# Patient Record
Sex: MALE | Race: WHITE | NOT HISPANIC OR LATINO | Employment: FULL TIME | URBAN - METROPOLITAN AREA
[De-identification: names, ages, dates, MRNs, and addresses within clinical notes are randomized per-mention and may not be internally consistent; named-entity substitution may affect disease eponyms.]

---

## 2017-07-25 RX ORDER — RAMIPRIL 5 MG/1
CAPSULE ORAL
Qty: 90 CAPSULE | Refills: 2 | Status: SHIPPED | OUTPATIENT
Start: 2017-07-25 | End: 2018-04-19 | Stop reason: SDUPTHER

## 2017-07-25 RX ORDER — PRAVASTATIN SODIUM 40 MG
TABLET ORAL
Qty: 90 TABLET | Refills: 2 | Status: SHIPPED | OUTPATIENT
Start: 2017-07-25 | End: 2018-04-19 | Stop reason: SDUPTHER

## 2017-07-26 ENCOUNTER — TELEPHONE (OUTPATIENT)
Dept: ONCOLOGY | Facility: CLINIC | Age: 47
End: 2017-07-26

## 2017-07-26 NOTE — TELEPHONE ENCOUNTER
----- Message from Og Bryant MD sent at 7/26/2017  4:05 PM EDT -----  Regarding: follow up  I saw him a year ago and suggested annual follow up, which should be around now.  Did he get a letter to schedule f/u with me?  RICK

## 2017-08-02 ENCOUNTER — LAB (OUTPATIENT)
Dept: OTHER | Facility: HOSPITAL | Age: 47
End: 2017-08-02

## 2017-08-02 DIAGNOSIS — R79.89 ELEVATED FERRITIN: ICD-10-CM

## 2017-08-02 LAB
ALBUMIN SERPL-MCNC: 4.7 G/DL (ref 3.5–5.2)
ALBUMIN/GLOB SERPL: 1.8 G/DL
ALP SERPL-CCNC: 55 U/L (ref 39–117)
ALT SERPL W P-5'-P-CCNC: 34 U/L (ref 1–41)
ANION GAP SERPL CALCULATED.3IONS-SCNC: 11.9 MMOL/L
AST SERPL-CCNC: 24 U/L (ref 1–40)
BASOPHILS # BLD AUTO: 0.04 10*3/MM3 (ref 0–0.2)
BASOPHILS NFR BLD AUTO: 0.8 % (ref 0–1.5)
BILIRUB SERPL-MCNC: 0.3 MG/DL (ref 0.1–1.2)
BUN BLD-MCNC: 15 MG/DL (ref 6–20)
BUN/CREAT SERPL: 14.4 (ref 7–25)
CALCIUM SPEC-SCNC: 9.5 MG/DL (ref 8.6–10.5)
CHLORIDE SERPL-SCNC: 104 MMOL/L (ref 98–107)
CO2 SERPL-SCNC: 28.1 MMOL/L (ref 22–29)
CREAT BLD-MCNC: 1.04 MG/DL (ref 0.76–1.27)
DEPRECATED RDW RBC AUTO: 41.1 FL (ref 37–54)
EOSINOPHIL # BLD AUTO: 0.15 10*3/MM3 (ref 0–0.7)
EOSINOPHIL NFR BLD AUTO: 3 % (ref 0.3–6.2)
ERYTHROCYTE [DISTWIDTH] IN BLOOD BY AUTOMATED COUNT: 12.8 % (ref 11.5–14.5)
FERRITIN SERPL-MCNC: 575.6 NG/ML (ref 30–400)
GFR SERPL CREATININE-BSD FRML MDRD: 77 ML/MIN/1.73
GLOBULIN UR ELPH-MCNC: 2.6 GM/DL
GLUCOSE BLD-MCNC: 102 MG/DL (ref 65–99)
HCT VFR BLD AUTO: 41.6 % (ref 40.4–52.2)
HGB BLD-MCNC: 14.2 G/DL (ref 13.7–17.6)
HGB RETIC QN: 33.9 PG (ref 32.7–38.6)
IMM GRANULOCYTES # BLD: 0.01 10*3/MM3 (ref 0–0.03)
IMM GRANULOCYTES NFR BLD: 0.2 % (ref 0–0.5)
IMM RETICS NFR: 9.9 % (ref 0.7–13.7)
IRON 24H UR-MRATE: 86 MCG/DL (ref 59–158)
IRON SATN MFR SERPL: 27 % (ref 20–50)
LYMPHOCYTES # BLD AUTO: 1.99 10*3/MM3 (ref 0.9–4.8)
LYMPHOCYTES NFR BLD AUTO: 40.1 % (ref 19.6–45.3)
MCH RBC QN AUTO: 29.8 PG (ref 27–32.7)
MCHC RBC AUTO-ENTMCNC: 34.1 G/DL (ref 32.6–36.4)
MCV RBC AUTO: 87.2 FL (ref 79.8–96.2)
MONOCYTES # BLD AUTO: 0.42 10*3/MM3 (ref 0.2–1.2)
MONOCYTES NFR BLD AUTO: 8.5 % (ref 5–12)
NEUTROPHILS # BLD AUTO: 2.35 10*3/MM3 (ref 1.9–8.1)
NEUTROPHILS NFR BLD AUTO: 47.4 % (ref 42.7–76)
NRBC BLD MANUAL-RTO: 0 /100 WBC (ref 0–0)
PLATELET # BLD AUTO: 256 10*3/MM3 (ref 140–500)
PMV BLD AUTO: 11 FL (ref 6–12)
POTASSIUM BLD-SCNC: 4.4 MMOL/L (ref 3.5–5.2)
PROT SERPL-MCNC: 7.3 G/DL (ref 6–8.5)
RBC # BLD AUTO: 4.77 10*6/MM3 (ref 4.6–6)
RETICS/RBC NFR AUTO: 1.56 % (ref 0.5–1.5)
SODIUM BLD-SCNC: 144 MMOL/L (ref 136–145)
TIBC SERPL-MCNC: 323 MCG/DL (ref 298–536)
TRANSFERRIN SERPL-MCNC: 217 MG/DL (ref 200–360)
WBC NRBC COR # BLD: 4.96 10*3/MM3 (ref 4.5–10.7)

## 2017-08-02 PROCEDURE — 85046 RETICYTE/HGB CONCENTRATE: CPT | Performed by: INTERNAL MEDICINE

## 2017-08-02 PROCEDURE — 80053 COMPREHEN METABOLIC PANEL: CPT | Performed by: INTERNAL MEDICINE

## 2017-08-02 PROCEDURE — 82728 ASSAY OF FERRITIN: CPT | Performed by: INTERNAL MEDICINE

## 2017-08-02 PROCEDURE — 85025 COMPLETE CBC W/AUTO DIFF WBC: CPT | Performed by: INTERNAL MEDICINE

## 2017-08-02 PROCEDURE — 83540 ASSAY OF IRON: CPT | Performed by: INTERNAL MEDICINE

## 2017-08-02 PROCEDURE — 84466 ASSAY OF TRANSFERRIN: CPT | Performed by: INTERNAL MEDICINE

## 2017-08-02 PROCEDURE — 36415 COLL VENOUS BLD VENIPUNCTURE: CPT

## 2017-08-09 ENCOUNTER — APPOINTMENT (OUTPATIENT)
Dept: OTHER | Facility: HOSPITAL | Age: 47
End: 2017-08-09

## 2017-08-09 ENCOUNTER — OFFICE VISIT (OUTPATIENT)
Dept: ONCOLOGY | Facility: CLINIC | Age: 47
End: 2017-08-09

## 2017-08-09 VITALS
WEIGHT: 204 LBS | TEMPERATURE: 98.8 F | RESPIRATION RATE: 16 BRPM | HEART RATE: 70 BPM | HEIGHT: 71 IN | DIASTOLIC BLOOD PRESSURE: 77 MMHG | OXYGEN SATURATION: 100 % | BODY MASS INDEX: 28.56 KG/M2 | SYSTOLIC BLOOD PRESSURE: 122 MMHG

## 2017-08-09 DIAGNOSIS — R79.89 ELEVATED FERRITIN: Primary | ICD-10-CM

## 2017-08-09 PROCEDURE — 99213 OFFICE O/P EST LOW 20 MIN: CPT | Performed by: INTERNAL MEDICINE

## 2017-08-09 PROCEDURE — G0463 HOSPITAL OUTPT CLINIC VISIT: HCPCS | Performed by: INTERNAL MEDICINE

## 2017-08-09 NOTE — PROGRESS NOTES
"[facility] GROUP OUTPATIENT FOLLOW UP CLINIC VISIT    REASON FOR FOLLOW-UP:    1. Elevated ferritin    HISTORY OF PRESENT ILLNESS:  Alon Rivera is a 46 y.o. male who returns today for follow up of the above issue.  He continues to feel well and has no complaints today.      PAST MEDICAL, SURGICAL, FAMILY, AND SOCIAL HISTORIES were reviewed with the patient and in the electronic medical record, and were updated if indicated.    ALLERGIES:  No Known Allergies    MEDICATIONS:  The medication list has been reviewed with the patient by the medical assistant, and the list has been updated in the electronic medical record, which I reviewed.  Medication dosages and frequencies were confirmed to be accurate.    REVIEW OF SYSTEMS:  PAIN:  See Vital Signs below.  GENERAL:  No fevers, chills, night sweats, or unintended weight loss.  SKIN:  No rashes or non-healing lesions.  HEME/LYMPH:  No abnormal bleeding.  No palpable lymphadenopathy.  EYES:  No vision changes or diplopia.  ENT:  No sore throat or difficulty swallowing.  RESPIRATORY:  No cough, shortness of breath, hemoptysis, or wheezing.  CARDIOVASCULAR:  No chest pain, palpitations, orthopnea, or dyspnea on exertion.  GASTROINTESTINAL:  No abdominal pain, nausea, vomiting, constipation, diarrhea, melena, or hematochezia.  GENITOURINARY:  No dysuria or hematuria.  MUSCULOSKELETAL:  No joint pain, swelling, or erythema.  NEUROLOGIC:  No dizziness, loss of consciousness, or seizures.  PSYCHIATRIC:  No depression, anxiety, or mood changes.    Vitals:    08/09/17 1144   BP: 122/77   Pulse: 70   Resp: 16   Temp: 98.8 °F (37.1 °C)   TempSrc: Oral   SpO2: 100%   Weight: 204 lb (92.5 kg)   Height: 71\" (180.3 cm)   PainSc: 0-No pain       PHYSICAL EXAMINATION:  GENERAL:  Well-developed well-nourished male; awake, alert and oriented, in no acute distress.  SKIN:  Warm and dry, without rashes, purpura, or petechiae.  HEAD:  Normocephalic, atraumatic.  EYES:  Pupils equal, " round and reactive to light.  Extraocular movements intact.  Conjunctivae normal.  EARS:  Hearing intact.  NOSE:  Septum midline.  No excoriations or nasal discharge.  MOUTH:  No stomatitis or ulcers.  Lips are normal.  THROAT:  Oropharynx without lesions or exudates.  NECK:  Supple with good range of motion; no thyromegaly or masses; no JVD or bruits.  LYMPHATICS:  No cervical, supraclavicular, axillary, or inguinal lymphadenopathy.  CHEST:  Lungs are clear to auscultation bilaterally.  No wheezes, rales, or rhonchi.  HEART:  Regular rate; normal rhythm.  No murmurs, gallops or rubs.  ABDOMEN:  Soft, non-tender, non-distended.  Normal active bowel sounds.  No organomegaly.  EXTREMITIES:  No clubbing, cyanosis, or edema.  NEUROLOGICAL:  No focal neurologic deficits.    DIAGNOSTIC DATA:  Lab Results   Component Value Date    WBC 4.96 08/02/2017    HGB 14.2 08/02/2017    HCT 41.6 08/02/2017    MCV 87.2 08/02/2017     08/02/2017     Ferritin 575.  27% iron saturation  CMP normal      ASSESSMENT:  This is a 46 y.o. male with:  1.  Elevated ferritin: His iron studies are normal suggesting that this is not secondary to iron overload.  That being said, I do not have another good explanation.  There is no significant alcohol use.  No evidence for autoimmune disease or inflammation.  Sedimentation rate and CRP were normal.  HFE gene testing was normal.  He did have some infections recently but these have been effectively treated and he is asymptomatic.  I do not think it is secondary to medication use.  I did a literature search to see if statins could cause an elevated ferritin level and, if anything, they should cause the ferritin to decrease.     Thankfully, now a year after we last checked his ferritin is down 200 points.  I will see him back in 1 year for follow-up in this ferritin has improved at that time and we will likely release him to follow up with primary care.    I did discuss with him today whether he  wanted further CT imaging of his chest to follow-up the abnormalities from a few years ago.  He does not wish to have any further imaging.    PLAN:  1.  Return in 12 months with labs including iron studies and a ferritin level done prior to that visit.    Og Bryant MD

## 2017-09-18 RX ORDER — PANTOPRAZOLE SODIUM 40 MG/1
TABLET, DELAYED RELEASE ORAL
Qty: 90 TABLET | Refills: 2 | Status: SHIPPED | OUTPATIENT
Start: 2017-09-18 | End: 2018-06-17 | Stop reason: SDUPTHER

## 2018-04-19 RX ORDER — PRAVASTATIN SODIUM 40 MG
TABLET ORAL
Qty: 90 TABLET | Refills: 1 | Status: SHIPPED | OUTPATIENT
Start: 2018-04-19 | End: 2018-10-23 | Stop reason: SDUPTHER

## 2018-04-19 RX ORDER — RAMIPRIL 5 MG/1
CAPSULE ORAL
Qty: 90 CAPSULE | Refills: 1 | Status: SHIPPED | OUTPATIENT
Start: 2018-04-19 | End: 2018-10-23 | Stop reason: SDUPTHER

## 2018-06-18 RX ORDER — PANTOPRAZOLE SODIUM 40 MG/1
TABLET, DELAYED RELEASE ORAL
Qty: 90 TABLET | Refills: 0 | Status: SHIPPED | OUTPATIENT
Start: 2018-06-18 | End: 2018-09-14 | Stop reason: SDUPTHER

## 2018-09-14 RX ORDER — PANTOPRAZOLE SODIUM 40 MG/1
TABLET, DELAYED RELEASE ORAL
Qty: 90 TABLET | Refills: 0 | Status: SHIPPED | OUTPATIENT
Start: 2018-09-14 | End: 2018-10-23 | Stop reason: SDUPTHER

## 2018-10-17 RX ORDER — PRAVASTATIN SODIUM 40 MG
TABLET ORAL
Qty: 90 TABLET | Refills: 0 | OUTPATIENT
Start: 2018-10-17

## 2018-10-17 RX ORDER — RAMIPRIL 5 MG/1
CAPSULE ORAL
Qty: 90 CAPSULE | Refills: 0 | OUTPATIENT
Start: 2018-10-17

## 2018-10-23 ENCOUNTER — OFFICE VISIT (OUTPATIENT)
Dept: SPORTS MEDICINE | Facility: CLINIC | Age: 48
End: 2018-10-23

## 2018-10-23 VITALS
BODY MASS INDEX: 26.32 KG/M2 | OXYGEN SATURATION: 99 % | HEART RATE: 64 BPM | DIASTOLIC BLOOD PRESSURE: 72 MMHG | HEIGHT: 71 IN | SYSTOLIC BLOOD PRESSURE: 120 MMHG | WEIGHT: 188 LBS

## 2018-10-23 DIAGNOSIS — K21.9 GASTROESOPHAGEAL REFLUX DISEASE, ESOPHAGITIS PRESENCE NOT SPECIFIED: ICD-10-CM

## 2018-10-23 DIAGNOSIS — Z00.00 ANNUAL PHYSICAL EXAM: Primary | ICD-10-CM

## 2018-10-23 DIAGNOSIS — Z12.11 COLON CANCER SCREENING: ICD-10-CM

## 2018-10-23 DIAGNOSIS — E78.2 MIXED HYPERLIPIDEMIA: ICD-10-CM

## 2018-10-23 DIAGNOSIS — I10 ESSENTIAL HYPERTENSION: ICD-10-CM

## 2018-10-23 PROCEDURE — 99396 PREV VISIT EST AGE 40-64: CPT | Performed by: FAMILY MEDICINE

## 2018-10-23 RX ORDER — RAMIPRIL 5 MG/1
5 CAPSULE ORAL DAILY
Qty: 90 CAPSULE | Refills: 3 | Status: SHIPPED | OUTPATIENT
Start: 2018-10-23 | End: 2020-02-05

## 2018-10-23 RX ORDER — PANTOPRAZOLE SODIUM 40 MG/1
40 TABLET, DELAYED RELEASE ORAL DAILY
Qty: 90 TABLET | Refills: 3 | Status: SHIPPED | OUTPATIENT
Start: 2018-10-23 | End: 2019-10-29

## 2018-10-23 RX ORDER — CIPROFLOXACIN 500 MG/1
TABLET, FILM COATED ORAL
Refills: 0 | COMMUNITY
Start: 2018-10-20 | End: 2018-11-29

## 2018-10-23 RX ORDER — METRONIDAZOLE 500 MG/1
TABLET ORAL
Refills: 0 | COMMUNITY
Start: 2018-10-20 | End: 2018-11-29

## 2018-10-23 RX ORDER — PRAVASTATIN SODIUM 40 MG
40 TABLET ORAL DAILY
Qty: 90 TABLET | Refills: 3 | Status: SHIPPED | OUTPATIENT
Start: 2018-10-23 | End: 2020-02-05

## 2018-10-23 NOTE — PROGRESS NOTES
"Alon Rivera is here today for an annual physical exam.     Eating a healthy diet. Exercising routinely. Recently losing weight on purpose.     Recently diagnosed with diverticulitis in urgent care, improving with cipro and flagyl. Had been having pain during the week, worsened Saturday and couldn't wait until today's visit.     GERD - mostly stable with protonix daily. Notes he does have some reflux most mornings. Trying to change diet and exercise recently     Health Maintenance   Topic Date Due   • LIPID PANEL  08/03/2017   • INFLUENZA VACCINE  08/01/2018   • ANNUAL PHYSICAL  10/24/2019   • TDAP/TD VACCINES (2 - Td) 10/29/2023       Review of Systems   Constitutional: Negative.    Respiratory: Negative.    Gastrointestinal:        Recent diverticulitis and Reflux, as per history of present illness   Genitourinary: Negative.    Musculoskeletal: Negative.    Psychiatric/Behavioral: Negative.        /72   Pulse 64   Ht 180.3 cm (71\")   Wt 85.3 kg (188 lb)   SpO2 99%   BMI 26.22 kg/m²      Physical Exam    Vital signs reviewed.  General appearance: No acute distress  Eyes: conjunctiva clear without erythema; pupils equally round and reactive  ENT: external ears and nose normal; hearing normal, oropharynx clear  Neck: supple; no thyromegaly  CV: normal rate and rhythm; no peripheral edema  Respiratory: normal respiratory effort; lungs clear to auscultation bilaterally  MSK: normal gait and station; no focal joint deformity or swelling  Skin: no rash or wounds; normal turgor  Neuro: cranial nerves 2-12 grossly intact; normal sensation to light touch  Psych: mood and affect normal; recent and remote memory intact    Alon was seen today for annual exam.    Diagnoses and all orders for this visit:    Annual physical exam  -     CBC & Differential  -     Comprehensive Metabolic Panel  -     Lipid Panel  -     Thyroid Cascade Profile  -     Urinalysis With Culture If Indicated - Urine, Clean " Catch    Gastroesophageal reflux disease, esophagitis presence not specified  -     Ambulatory Referral to Gastroenterology    Colon cancer screening  -     Ambulatory Referral to Gastroenterology    Other orders  -     ramipril (ALTACE) 5 MG capsule; Take 1 capsule by mouth Daily.  -     pravastatin (PRAVACHOL) 40 MG tablet; Take 1 tablet by mouth Daily.  -     pantoprazole (PROTONIX) 40 MG EC tablet; Take 1 tablet by mouth Daily.        Encourage healthy diet and exercise.  Encourage patient to stay up to date on screening examinations as indicated based on age and risk factors.  Recommend follow-up with GI to consider reevaluation of his chronic, uncontrolled reflux and plan colonoscopy based on new age recommendation as well as recent diverticulitis.  In the interim, recommend he try adding Zantac or Pepcid at bedtime.  Also consider elevating the head of the bed.  Continue current management of mild hypertension and hyperlipidemia.    EMR Dragon/Transcription disclaimer:    Much of this encounter note is an electronic transcription/translation of spoken language to printed text.  The electronic translation of spoken language may permit erroneous, or at times, nonsensical words or phrases to be inadvertently transcribed.  Although I have reviewed the note for such errors some may still exist.

## 2018-10-24 PROBLEM — Z22.7 LATENT TUBERCULOSIS: Status: ACTIVE | Noted: 2018-10-24

## 2018-10-26 LAB
ALBUMIN SERPL-MCNC: 5 G/DL (ref 3.5–5.2)
ALBUMIN/GLOB SERPL: 1.8 G/DL
ALP SERPL-CCNC: 50 U/L (ref 39–117)
ALT SERPL-CCNC: 35 U/L (ref 1–41)
APPEARANCE UR: CLEAR
AST SERPL-CCNC: 30 U/L (ref 1–40)
BACTERIA #/AREA URNS HPF: NORMAL /HPF
BACTERIA UR CULT: NORMAL
BACTERIA UR CULT: NORMAL
BASOPHILS # BLD AUTO: 0.02 10*3/MM3 (ref 0–0.2)
BASOPHILS NFR BLD AUTO: 0.5 % (ref 0–1.5)
BILIRUB SERPL-MCNC: 0.3 MG/DL (ref 0.1–1.2)
BILIRUB UR QL STRIP: NEGATIVE
BUN SERPL-MCNC: 15 MG/DL (ref 6–20)
BUN/CREAT SERPL: 13.8 (ref 7–25)
CALCIUM SERPL-MCNC: 10.7 MG/DL (ref 8.6–10.5)
CHLORIDE SERPL-SCNC: 99 MMOL/L (ref 98–107)
CHOLEST SERPL-MCNC: 165 MG/DL (ref 0–200)
CO2 SERPL-SCNC: 28.8 MMOL/L (ref 22–29)
COLOR UR: YELLOW
CREAT SERPL-MCNC: 1.09 MG/DL (ref 0.76–1.27)
EOSINOPHIL # BLD AUTO: 0.17 10*3/MM3 (ref 0–0.7)
EOSINOPHIL NFR BLD AUTO: 3.9 % (ref 0.3–6.2)
EPI CELLS #/AREA URNS HPF: NORMAL /HPF
ERYTHROCYTE [DISTWIDTH] IN BLOOD BY AUTOMATED COUNT: 13.5 % (ref 11.5–14.5)
GLOBULIN SER CALC-MCNC: 2.8 GM/DL
GLUCOSE SERPL-MCNC: 88 MG/DL (ref 65–99)
GLUCOSE UR QL: NEGATIVE
HCT VFR BLD AUTO: 46.8 % (ref 40.4–52.2)
HDLC SERPL-MCNC: 47 MG/DL (ref 40–60)
HGB BLD-MCNC: 14.5 G/DL (ref 13.7–17.6)
HGB UR QL STRIP: NEGATIVE
IMM GRANULOCYTES # BLD: 0 10*3/MM3 (ref 0–0.03)
IMM GRANULOCYTES NFR BLD: 0 % (ref 0–0.5)
KETONES UR QL STRIP: NEGATIVE
LDLC SERPL CALC-MCNC: 103 MG/DL (ref 0–100)
LEUKOCYTE ESTERASE UR QL STRIP: NEGATIVE
LYMPHOCYTES # BLD AUTO: 1.79 10*3/MM3 (ref 0.9–4.8)
LYMPHOCYTES NFR BLD AUTO: 40.9 % (ref 19.6–45.3)
MCH RBC QN AUTO: 29.1 PG (ref 27–32.7)
MCHC RBC AUTO-ENTMCNC: 31 G/DL (ref 32.6–36.4)
MCV RBC AUTO: 94 FL (ref 79.8–96.2)
MICRO URNS: ABNORMAL
MONOCYTES # BLD AUTO: 0.35 10*3/MM3 (ref 0.2–1.2)
MONOCYTES NFR BLD AUTO: 8 % (ref 5–12)
MUCOUS THREADS URNS QL MICRO: PRESENT /HPF
NEUTROPHILS # BLD AUTO: 2.05 10*3/MM3 (ref 1.9–8.1)
NEUTROPHILS NFR BLD AUTO: 46.7 % (ref 42.7–76)
NITRITE UR QL STRIP: POSITIVE
PH UR STRIP: 5.5 [PH] (ref 5–7.5)
PLATELET # BLD AUTO: 292 10*3/MM3 (ref 140–500)
POTASSIUM SERPL-SCNC: 4.3 MMOL/L (ref 3.5–5.2)
PROT SERPL-MCNC: 7.8 G/DL (ref 6–8.5)
PROT UR QL STRIP: NEGATIVE
RBC # BLD AUTO: 4.98 10*6/MM3 (ref 4.6–6)
RBC #/AREA URNS HPF: NORMAL /HPF
SODIUM SERPL-SCNC: 143 MMOL/L (ref 136–145)
SP GR UR: 1.02 (ref 1–1.03)
TRIGL SERPL-MCNC: 75 MG/DL (ref 0–150)
TSH SERPL DL<=0.005 MIU/L-ACNC: 3.17 UIU/ML (ref 0.45–4.5)
URINALYSIS REFLEX: ABNORMAL
UROBILINOGEN UR STRIP-MCNC: 0.2 MG/DL (ref 0.2–1)
VLDLC SERPL CALC-MCNC: 15 MG/DL (ref 5–40)
WBC # BLD AUTO: 4.38 10*3/MM3 (ref 4.5–10.7)
WBC #/AREA URNS HPF: NORMAL /HPF

## 2018-10-29 ENCOUNTER — TELEPHONE (OUTPATIENT)
Dept: SPORTS MEDICINE | Facility: CLINIC | Age: 48
End: 2018-10-29

## 2018-11-29 ENCOUNTER — OFFICE VISIT (OUTPATIENT)
Dept: GASTROENTEROLOGY | Facility: CLINIC | Age: 48
End: 2018-11-29

## 2018-11-29 VITALS
WEIGHT: 185.8 LBS | TEMPERATURE: 98.4 F | SYSTOLIC BLOOD PRESSURE: 138 MMHG | BODY MASS INDEX: 26.01 KG/M2 | HEIGHT: 71 IN | DIASTOLIC BLOOD PRESSURE: 74 MMHG

## 2018-11-29 DIAGNOSIS — Z12.11 ENCOUNTER FOR SCREENING FOR MALIGNANT NEOPLASM OF COLON: ICD-10-CM

## 2018-11-29 DIAGNOSIS — K57.92 DIVERTICULITIS: Primary | ICD-10-CM

## 2018-11-29 PROCEDURE — 99214 OFFICE O/P EST MOD 30 MIN: CPT | Performed by: INTERNAL MEDICINE

## 2018-11-29 RX ORDER — RANITIDINE 150 MG/1
150 TABLET ORAL NIGHTLY
COMMUNITY
End: 2019-11-20 | Stop reason: SDUPTHER

## 2018-11-29 NOTE — PROGRESS NOTES
Answers for HPI/ROS submitted by the patient on 11/27/2018   Abdominal pain  Chronicity: recurrent  Onset: more than 1 month ago  Onset quality: gradual  Frequency: every several days  Episode duration: 2 hours  Progression since onset: waxing and waning  Pain location: periumbilical region  Pain - numeric: 3/10  Pain quality: dull  Radiates to: does not radiate  anorexia: No  arthralgias: No  belching: No  constipation: No  diarrhea: No  dysuria: No  fever: No  flatus: No  frequency: No  headaches: No  hematochezia: No  hematuria: No  melena: No  myalgias: No  nausea: No  weight loss: No  vomiting: No  Aggravated by: nothing  Relieved by: movement, standing  Chief Complaint   Patient presents with   • Abdominal Pain   • Heartburn       Alon Rivera is a  48 y.o. male here for a follow up visit for history of diverticulitis.    HPI     Patient 48-year-old male with history of hypertension and hyperlipidemia as well as GERD presenting after episode of lower abdominal pain diagnosed clinically as diverticulitis.  Patient reports no CAT scan was performed but patient put on antibiotics with good response.  Patient with clinical diverticulitis here for follow-up.  Patient has had intermittent episodes of reflux type symptoms with no heartburn but no dysphagia was noted.  Patient does report increased exertion and at exercise as well as monitoring his diet with 30 pound weight loss.  Patient reports this was particularly related to his dieting.  Patient denies hematemesis no melena or bright red blood per rectum.      Past Medical History:   Diagnosis Date   • Allergic rhinitis    • GERD (gastroesophageal reflux disease)    • H/O Chronic back pain    • H/O Hypogonadism in male    • H/O Pulmonary histoplasmosis     Resolved   • Hyperlipidemia    • Hypertension          Current Outpatient Medications:   •  Multiple Vitamins-Minerals (MULTIVITAMIN ADULT PO), Take 1 tablet by mouth Daily., Disp: , Rfl:   •  pantoprazole  (PROTONIX) 40 MG EC tablet, Take 1 tablet by mouth Daily., Disp: 90 tablet, Rfl: 3  •  pravastatin (PRAVACHOL) 40 MG tablet, Take 1 tablet by mouth Daily., Disp: 90 tablet, Rfl: 3  •  ramipril (ALTACE) 5 MG capsule, Take 1 capsule by mouth Daily., Disp: 90 capsule, Rfl: 3  •  raNITIdine (ZANTAC) 150 MG tablet, Take 150 mg by mouth Every Night., Disp: , Rfl:     No Known Allergies    Social History     Socioeconomic History   • Marital status:      Spouse name: Shama   • Number of children: 2   • Years of education: Not on file   • Highest education level: Not on file   Social Needs   • Financial resource strain: Not on file   • Food insecurity - worry: Not on file   • Food insecurity - inability: Not on file   • Transportation needs - medical: Not on file   • Transportation needs - non-medical: Not on file   Occupational History   • Occupation:      Employer: [id number]   Tobacco Use   • Smoking status: Never Smoker   • Smokeless tobacco: Never Used   Substance and Sexual Activity   • Alcohol use: Yes     Comment: occasional   • Drug use: No   • Sexual activity: Not on file   Other Topics Concern   • Not on file   Social History Narrative   • Not on file       Family History   Problem Relation Age of Onset   • Cancer Other        Review of Systems   Constitutional: Negative for fever.   HENT: Negative for trouble swallowing.    Respiratory: Negative.    Cardiovascular: Negative.    Gastrointestinal: Positive for abdominal pain. Negative for constipation, diarrhea, nausea and vomiting.   Genitourinary: Negative for dysuria, frequency and hematuria.   Musculoskeletal: Negative for arthralgias and myalgias.   Skin: Negative.    Neurological: Negative for headaches.   Hematological: Negative.        Vitals:    11/29/18 1041   BP: 138/74   Temp: 98.4 °F (36.9 °C)       Physical Exam   Constitutional: He is oriented to person, place, and time. He appears well-developed and well-nourished.   HENT:    Head: Normocephalic and atraumatic.   Eyes: Pupils are equal, round, and reactive to light. No scleral icterus.   Cardiovascular: Normal rate and regular rhythm. Exam reveals no gallop and no friction rub.   No murmur heard.  Pulmonary/Chest: Effort normal.   Abdominal: Soft. Bowel sounds are normal. He exhibits no distension and no mass. There is no tenderness. No hernia.   Neurological: He is alert and oriented to person, place, and time.   Skin: Skin is warm and dry. No rash noted.   Psychiatric: He has a normal mood and affect. His behavior is normal.   Vitals reviewed.      Office Visit on 10/23/2018   Component Date Value Ref Range Status   • WBC 10/23/2018 4.38* 4.50 - 10.70 10*3/mm3 Final   • RBC 10/23/2018 4.98  4.60 - 6.00 10*6/mm3 Final   • Hemoglobin 10/23/2018 14.5  13.7 - 17.6 g/dL Final   • Hematocrit 10/23/2018 46.8  40.4 - 52.2 % Final   • MCV 10/23/2018 94.0  79.8 - 96.2 fL Final   • MCH 10/23/2018 29.1  27.0 - 32.7 pg Final   • MCHC 10/23/2018 31.0* 32.6 - 36.4 g/dL Final   • RDW 10/23/2018 13.5  11.5 - 14.5 % Final   • Platelets 10/23/2018 292  140 - 500 10*3/mm3 Final   • Neutrophil Rel % 10/23/2018 46.7  42.7 - 76.0 % Final   • Lymphocyte Rel % 10/23/2018 40.9  19.6 - 45.3 % Final   • Monocyte Rel % 10/23/2018 8.0  5.0 - 12.0 % Final   • Eosinophil Rel % 10/23/2018 3.9  0.3 - 6.2 % Final   • Basophil Rel % 10/23/2018 0.5  0.0 - 1.5 % Final   • Neutrophils Absolute 10/23/2018 2.05  1.90 - 8.10 10*3/mm3 Final   • Lymphocytes Absolute 10/23/2018 1.79  0.90 - 4.80 10*3/mm3 Final   • Monocytes Absolute 10/23/2018 0.35  0.20 - 1.20 10*3/mm3 Final   • Eosinophils Absolute 10/23/2018 0.17  0.00 - 0.70 10*3/mm3 Final   • Basophils Absolute 10/23/2018 0.02  0.00 - 0.20 10*3/mm3 Final   • Immature Granulocyte Rel % 10/23/2018 0.0  0.0 - 0.5 % Final   • Immature Grans Absolute 10/23/2018 0.00  0.00 - 0.03 10*3/mm3 Final   • Glucose 10/23/2018 88  65 - 99 mg/dL Final   • BUN 10/23/2018 15  6 - 20 mg/dL  Final   • Creatinine 10/23/2018 1.09  0.76 - 1.27 mg/dL Final   • eGFR Non  Am 10/23/2018 73  >60 mL/min/1.73 Final   • eGFR African Am 10/23/2018 88  >60 mL/min/1.73 Final   • BUN/Creatinine Ratio 10/23/2018 13.8  7.0 - 25.0 Final   • Sodium 10/23/2018 143  136 - 145 mmol/L Final   • Potassium 10/23/2018 4.3  3.5 - 5.2 mmol/L Final   • Chloride 10/23/2018 99  98 - 107 mmol/L Final   • Total CO2 10/23/2018 28.8  22.0 - 29.0 mmol/L Final   • Calcium 10/23/2018 10.7* 8.6 - 10.5 mg/dL Final   • Total Protein 10/23/2018 7.8  6.0 - 8.5 g/dL Final   • Albumin 10/23/2018 5.00  3.50 - 5.20 g/dL Final   • Globulin 10/23/2018 2.8  gm/dL Final   • A/G Ratio 10/23/2018 1.8  g/dL Final   • Total Bilirubin 10/23/2018 0.3  0.1 - 1.2 mg/dL Final   • Alkaline Phosphatase 10/23/2018 50  39 - 117 U/L Final   • AST (SGOT) 10/23/2018 30  1 - 40 U/L Final   • ALT (SGPT) 10/23/2018 35  1 - 41 U/L Final   • Total Cholesterol 10/23/2018 165  0 - 200 mg/dL Final   • Triglycerides 10/23/2018 75  0 - 150 mg/dL Final   • HDL Cholesterol 10/23/2018 47  40 - 60 mg/dL Final   • VLDL Cholesterol 10/23/2018 15  5 - 40 mg/dL Final   • LDL Cholesterol  10/23/2018 103* 0 - 100 mg/dL Final   • TSH 10/23/2018 3.170  0.450 - 4.500 uIU/mL Final   • Specific Gravity, UA 10/23/2018 1.023  1.005 - 1.030 Final   • pH, UA 10/23/2018 5.5  5.0 - 7.5 Final   • Color, UA 10/23/2018 Yellow  Yellow Final   • Appearance, UA 10/23/2018 Clear  Clear Final   • Leukocytes, UA 10/23/2018 Negative  Negative Final   • Protein 10/23/2018 Negative  Negative/Trace Final   • Glucose, UA 10/23/2018 Negative  Negative Final   • Ketones 10/23/2018 Negative  Negative Final   • Blood, UA 10/23/2018 Negative  Negative Final   • Bilirubin, UA 10/23/2018 Negative  Negative Final   • Urobilinogen, UA 10/23/2018 0.2  0.2 - 1.0 mg/dL Final   • Nitrite, UA 10/23/2018 Positive* Negative Final   • Microscopic Examination 10/23/2018 See below:   Final   • Urinalysis Reflex 10/23/2018  Comment   Final   • WBC, UA 10/23/2018 None seen  0 - 5 /hpf Final   • RBC, UA 10/23/2018 0-2  0 - 2 /hpf Final   • Epithelial Cells (non renal) 10/23/2018 None seen  0 - 10 /hpf Final   • Mucus, UA 10/23/2018 Present  Not Estab. /hpf Final   • Bacteria, UA 10/23/2018 Few  None seen/Few /hpf Final   • Urine Culture 10/23/2018 Final report   Final   • Result 1 10/23/2018 Comment   Final       Alon was seen today for abdominal pain and heartburn.    Diagnoses and all orders for this visit:    Diverticulitis  -     Case Request; Standing  -     Case Request    Encounter for screening for malignant neoplasm of colon  -     Case Request; Standing  -     Case Request    Other orders  -     Follow Anesthesia Guidelines / Standing Orders; Future  -     Implement Anesthesia orders day of procedure.; Standing  -     Obtain informed consent; Standing      Patient 48-year-old male with history of GERD had noted some reflux symptoms though no heartburn or burning.  Patient had intentionally lost weight and reports good response with decrease symptoms.  Patient denies dysphagia or other alarm symptoms.  In this situation there is no indication for follow-up EGD will continue his current diet and exercise regimen and medications and follow his reflux symptomatically.  In view of his episode of diverticulitis however do recommend proceeding with colonoscopy.  Maintain a high-fiber diet and increase water intake to keep stool consistency soft and follow-up pending results on colonoscopy.

## 2019-01-11 ENCOUNTER — HOSPITAL ENCOUNTER (OUTPATIENT)
Facility: HOSPITAL | Age: 49
Setting detail: HOSPITAL OUTPATIENT SURGERY
Discharge: HOME | End: 2019-01-11
Attending: INTERNAL MEDICINE | Admitting: INTERNAL MEDICINE

## 2019-01-11 ENCOUNTER — ANESTHESIA (OUTPATIENT)
Dept: GASTROENTEROLOGY | Facility: HOSPITAL | Age: 49
End: 2019-01-11

## 2019-01-11 ENCOUNTER — ANESTHESIA EVENT (OUTPATIENT)
Dept: GASTROENTEROLOGY | Facility: HOSPITAL | Age: 49
End: 2019-01-11

## 2019-01-11 VITALS
SYSTOLIC BLOOD PRESSURE: 126 MMHG | WEIGHT: 177.13 LBS | BODY MASS INDEX: 24.8 KG/M2 | TEMPERATURE: 98 F | HEART RATE: 75 BPM | HEIGHT: 71 IN | DIASTOLIC BLOOD PRESSURE: 85 MMHG | RESPIRATION RATE: 16 BRPM | OXYGEN SATURATION: 99 %

## 2019-01-11 PROCEDURE — 45378 DIAGNOSTIC COLONOSCOPY: CPT | Performed by: INTERNAL MEDICINE

## 2019-01-11 PROCEDURE — S0260 H&P FOR SURGERY: HCPCS | Performed by: INTERNAL MEDICINE

## 2019-01-11 PROCEDURE — 25010000002 PROPOFOL 10 MG/ML EMULSION: Performed by: NURSE ANESTHETIST, CERTIFIED REGISTERED

## 2019-01-11 RX ORDER — PROPOFOL 10 MG/ML
VIAL (ML) INTRAVENOUS AS NEEDED
Status: DISCONTINUED | OUTPATIENT
Start: 2019-01-11 | End: 2019-01-11 | Stop reason: SURG

## 2019-01-11 RX ORDER — LIDOCAINE HYDROCHLORIDE 20 MG/ML
INJECTION, SOLUTION INFILTRATION; PERINEURAL AS NEEDED
Status: DISCONTINUED | OUTPATIENT
Start: 2019-01-11 | End: 2019-01-11 | Stop reason: SURG

## 2019-01-11 RX ORDER — PROPOFOL 10 MG/ML
VIAL (ML) INTRAVENOUS CONTINUOUS PRN
Status: DISCONTINUED | OUTPATIENT
Start: 2019-01-11 | End: 2019-01-11 | Stop reason: SURG

## 2019-01-11 RX ORDER — SODIUM CHLORIDE 0.9 % (FLUSH) 0.9 %
3 SYRINGE (ML) INJECTION AS NEEDED
Status: DISCONTINUED | OUTPATIENT
Start: 2019-01-11 | End: 2019-01-11 | Stop reason: HOSPADM

## 2019-01-11 RX ORDER — SODIUM CHLORIDE, SODIUM LACTATE, POTASSIUM CHLORIDE, CALCIUM CHLORIDE 600; 310; 30; 20 MG/100ML; MG/100ML; MG/100ML; MG/100ML
1000 INJECTION, SOLUTION INTRAVENOUS CONTINUOUS
Status: DISCONTINUED | OUTPATIENT
Start: 2019-01-11 | End: 2019-01-11 | Stop reason: HOSPADM

## 2019-01-11 RX ADMIN — SODIUM CHLORIDE, POTASSIUM CHLORIDE, SODIUM LACTATE AND CALCIUM CHLORIDE 1000 ML: 600; 310; 30; 20 INJECTION, SOLUTION INTRAVENOUS at 08:49

## 2019-01-11 RX ADMIN — LIDOCAINE HYDROCHLORIDE 60 MG: 20 INJECTION, SOLUTION INFILTRATION; PERINEURAL at 09:32

## 2019-01-11 RX ADMIN — PROPOFOL 100 MG: 10 INJECTION, EMULSION INTRAVENOUS at 09:32

## 2019-01-11 RX ADMIN — PROPOFOL 250 MCG/KG/MIN: 10 INJECTION, EMULSION INTRAVENOUS at 09:33

## 2019-01-11 NOTE — ANESTHESIA POSTPROCEDURE EVALUATION
"Patient: Alon Rivera    Procedure Summary     Date:  01/11/19 Room / Location:   [facility]    Anesthesia Start:  0926 Anesthesia Stop:  0948    Procedure:  COLONOSCOPY TO TERMINAL ILEUM (N/A ) Diagnosis:       Diverticulitis      Encounter for screening for malignant neoplasm of colon      Diverticulosis      (Diverticulitis [K57.92])      (Encounter for screening for malignant neoplasm of colon [Z12.11])    Surgeon:  Ramesh Austin MD Provider:  Abdulkadir Gurrola MD    Anesthesia Type:  MAC ASA Status:  2          Anesthesia Type: MAC  Last vitals  BP   126/85 (01/11/19 1008)   Temp   36.7 °C (98 °F) (01/11/19 0834)   Pulse   75 (01/11/19 1008)   Resp   16 (01/11/19 1008)     SpO2   99 % (01/11/19 1008)     Post Anesthesia Care and Evaluation    Patient location during evaluation: PACU  Patient participation: complete - patient participated  Level of consciousness: awake and alert  Pain management: adequate  Airway patency: patent  Anesthetic complications: No anesthetic complications    Cardiovascular status: acceptable  Respiratory status: acceptable  Hydration status: acceptable    Comments: /85   Pulse 75   Temp 36.7 °C (98 °F) (Oral)   Resp 16   Ht 180.3 cm (71\")   Wt 80.3 kg (177 lb 2 oz)   SpO2 99%   BMI 24.70 kg/m²               "

## 2019-01-11 NOTE — BRIEF OP NOTE
COLONOSCOPY  Progress Note    Alon PETE judy  1/11/2019    Pre-op Diagnosis:   Diverticulitis [K57.92]  Encounter for screening for malignant neoplasm of colon [Z12.11]       Post-Op Diagnosis Codes:     * Diverticulitis [K57.92]     * Encounter for screening for malignant neoplasm of colon [Z12.11]     * Diverticulosis [K57.90]    Procedure/CPT® Codes:      Procedure(s):  COLONOSCOPY TO TERMINAL ILEUM    Surgeon(s):  Ramesh Austin MD    Anesthesia: Monitor Anesthesia Care    Staff:   Endo Technician: Isabela Resendez; Luz Marina Hector, RN  Endo Nurse: Tiffanie Deras RN    Estimated Blood Loss: none    Urine Voided: * No values recorded between 1/11/2019  9:26 AM and 1/11/2019  9:43 AM *    Specimens:                None      Drains:      Findings: Colonoscopy to terminal ileum with normal ileum mucosa.  Rare sigmoid diverticulosis noted remainder colonic mucosa was normal.    Complications: None      Ramesh Austin MD     Date: 1/11/2019  Time: 9:44 AM

## 2019-01-11 NOTE — DISCHARGE INSTRUCTIONS
For the next 24 hours patient needs to be with a responsible adult.    For 24 hours DO NOT drive, operate machinery, appliances, drink alcohol, make important decisions or sign legal documents.    Start with a light or bland diet and advance to regular diet as tolerated.    Follow recommendations on procedure report provided by your doctor.    Call Dr. Austin for problems [phone]    Problems may include but not limited to: large amounts of bleeding, trouble breathing, repeated vomiting, severe unrelieved pain, fever or chills.

## 2019-01-11 NOTE — H&P
"[facility]  Pre Procedure History & Physical    Chief Complaint:   History diverticulitis    Subjective     HPI:   Patient 48-year-old male with history diverticulitis hyperlipidemia and hypertension presenting for screening.  Patient currently denies GI complaints here for colonoscopy.    Past Medical History:   Past Medical History:   Diagnosis Date   • Allergic rhinitis    • Elevated cholesterol    • GERD (gastroesophageal reflux disease)    • H/O Chronic back pain    • H/O Hypogonadism in male    • H/O Pulmonary histoplasmosis     Resolved   • Hyperlipidemia    • Hypertension    • Latent tuberculosis        Past Surgical History:  Past Surgical History:   Procedure Laterality Date   • COLONOSCOPY      in his 20's   • LUNG BIOPSY         Family History:  Family History   Problem Relation Age of Onset   • Cancer Other        Social History:   reports that  has never smoked. he has never used smokeless tobacco. He reports that he drinks alcohol. He reports that he does not use drugs.    Medications:   Medications Prior to Admission   Medication Sig Dispense Refill Last Dose   • Multiple Vitamins-Minerals (MULTIVITAMIN ADULT PO) Take 1 tablet by mouth Daily.   1/10/2019 at Unknown time   • pantoprazole (PROTONIX) 40 MG EC tablet Take 1 tablet by mouth Daily. 90 tablet 3 1/11/2019 at Unknown time   • pravastatin (PRAVACHOL) 40 MG tablet Take 1 tablet by mouth Daily. 90 tablet 3 1/10/2019 at Unknown time   • ramipril (ALTACE) 5 MG capsule Take 1 capsule by mouth Daily. 90 capsule 3 1/11/2019 at Unknown time   • raNITIdine (ZANTAC) 150 MG tablet Take 150 mg by mouth Every Night.   1/10/2019 at Unknown time       Allergies:  Patient has no known allergies.    ROS:    Pertinent items are noted in HPI     Objective     Blood pressure 124/74, pulse 64, temperature 98 °F (36.7 °C), temperature source Oral, resp. rate 17, height 180.3 cm (71\"), weight 80.3 kg (177 lb 2 oz), SpO2 99 %.    Physical " Exam   Constitutional: Pt is oriented to person, place, and time and well-developed, well-nourished, and in no distress.   Mouth/Throat: Oropharynx is clear and moist.   Neck: Normal range of motion.   Cardiovascular: Normal rate, regular rhythm and normal heart sounds.    Pulmonary/Chest: Effort normal and breath sounds normal.   Abdominal: Soft. Nontender  Skin: Skin is warm and dry.   Psychiatric: Mood, memory, affect and judgment normal.     Assessment/Plan     Diagnosis:  Colonoscopy screening  History diverticulitis    Anticipated Surgical Procedure:  Colonoscopy    The risks, benefits, and alternatives of this procedure have been discussed with the patient or the responsible party- the patient understands and agrees to proceed.

## 2019-01-11 NOTE — ANESTHESIA PREPROCEDURE EVALUATION
Anesthesia Evaluation     Patient summary reviewed and Nursing notes reviewed   NPO Solid Status: > 8 hours  NPO Liquid Status: > 8 hours           Airway   Mallampati: II  Dental      Pulmonary - negative pulmonary ROS and normal exam    breath sounds clear to auscultation  Cardiovascular - normal exam    (+) hypertension, hyperlipidemia,       Neuro/Psych- negative ROS  GI/Hepatic/Renal/Endo    (+)  GERD,      Musculoskeletal (-) negative ROS    Abdominal    Substance History - negative use     OB/GYN          Other - negative ROS                       Anesthesia Plan    ASA 2     MAC     intravenous induction   Anesthetic plan, all risks, benefits, and alternatives have been provided, discussed and informed consent has been obtained with: patient.    Plan discussed with CRNA.      
02-Dec-2018 16:41

## 2019-10-21 RX ORDER — PRAVASTATIN SODIUM 40 MG
TABLET ORAL
Qty: 90 TABLET | Refills: 2 | OUTPATIENT
Start: 2019-10-21

## 2019-10-21 RX ORDER — RAMIPRIL 5 MG/1
CAPSULE ORAL
Qty: 90 CAPSULE | Refills: 2 | OUTPATIENT
Start: 2019-10-21

## 2019-10-29 ENCOUNTER — OFFICE VISIT (OUTPATIENT)
Dept: SPORTS MEDICINE | Facility: CLINIC | Age: 49
End: 2019-10-29

## 2019-10-29 VITALS
HEART RATE: 60 BPM | DIASTOLIC BLOOD PRESSURE: 70 MMHG | HEIGHT: 71 IN | WEIGHT: 178 LBS | SYSTOLIC BLOOD PRESSURE: 112 MMHG | OXYGEN SATURATION: 99 % | BODY MASS INDEX: 24.92 KG/M2

## 2019-10-29 DIAGNOSIS — E78.2 MIXED HYPERLIPIDEMIA: ICD-10-CM

## 2019-10-29 DIAGNOSIS — Z00.00 ANNUAL PHYSICAL EXAM: Primary | ICD-10-CM

## 2019-10-29 DIAGNOSIS — I10 ESSENTIAL HYPERTENSION: ICD-10-CM

## 2019-10-29 DIAGNOSIS — K21.9 GASTROESOPHAGEAL REFLUX DISEASE, ESOPHAGITIS PRESENCE NOT SPECIFIED: ICD-10-CM

## 2019-10-29 PROCEDURE — 99396 PREV VISIT EST AGE 40-64: CPT | Performed by: FAMILY MEDICINE

## 2019-10-29 RX ORDER — ESOMEPRAZOLE MAGNESIUM 40 MG/1
40 CAPSULE, DELAYED RELEASE ORAL
Qty: 90 CAPSULE | Refills: 3 | Status: SHIPPED | OUTPATIENT
Start: 2019-10-29 | End: 2020-10-19

## 2019-10-29 NOTE — PROGRESS NOTES
"Alon Rivera is here today for an annual physical exam.     Eating a healthy diet. Exercising routinely.   GERD - having reflux 2-3 nights a week recently despite use of protonix qAM and H2 qPM. Despite continued healthy diet and exercise, intentional weight loss.     PHQ-2 Depression Screening  Little interest or pleasure in doing things? 0   Feeling down, depressed, or hopeless? 0   PHQ-2 Total Score 0        Health Maintenance   Topic Date Due   • INFLUENZA VACCINE  08/01/2019   • LIPID PANEL  10/23/2019   • ANNUAL PHYSICAL  10/30/2020   • TDAP/TD VACCINES (2 - Td) 10/29/2023   • COLONOSCOPY  01/11/2029       Review of Systems   Constitutional: Negative.    Respiratory: Negative.    Cardiovascular: Negative.        /70   Pulse 60   Ht 180.3 cm (70.98\")   Wt 80.7 kg (178 lb)   SpO2 99%   BMI 24.84 kg/m²      Physical Exam    Vital signs reviewed.  General appearance: No acute distress  Eyes: conjunctiva clear without erythema; pupils equally round and reactive  ENT: external ears and nose normal; hearing normal, oropharynx clear  Neck: supple; no thyromegaly  CV: normal rate and rhythm; no peripheral edema  Respiratory: normal respiratory effort; lungs clear to auscultation bilaterally  MSK: normal gait and station; no focal joint deformity or swelling  Skin: no rash or wounds; normal turgor  Neuro: cranial nerves 2-12 grossly intact; normal sensation to light touch  Psych: mood and affect normal; recent and remote memory intact      Current Outpatient Medications:   •  Multiple Vitamins-Minerals (MULTIVITAMIN ADULT PO), Take 1 tablet by mouth Daily., Disp: , Rfl:   •  pravastatin (PRAVACHOL) 40 MG tablet, Take 1 tablet by mouth Daily., Disp: 90 tablet, Rfl: 3  •  ramipril (ALTACE) 5 MG capsule, Take 1 capsule by mouth Daily., Disp: 90 capsule, Rfl: 3  •  raNITIdine (ZANTAC) 150 MG tablet, Take 150 mg by mouth Every Night., Disp: , Rfl:   •  esomeprazole (nexIUM) 40 MG capsule, Take 1 capsule by mouth " Every Morning Before Breakfast., Disp: 90 capsule, Rfl: 3    Alon was seen today for annual exam.    Diagnoses and all orders for this visit:    Annual physical exam  -     CBC & Differential  -     Comprehensive Metabolic Panel  -     Lipid Panel With / Chol / HDL Ratio  -     Thyroid Cascade Profile  -     Urinalysis With Culture If Indicated -    Essential hypertension    Mixed hyperlipidemia  -     Lipid Panel With / Chol / HDL Ratio    Gastroesophageal reflux disease, esophagitis presence not specified  -     esomeprazole (nexIUM) 40 MG capsule; Take 1 capsule by mouth Every Morning Before Breakfast.        Encourage healthy diet and exercise.  Encourage patient to stay up to date on screening examinations as indicated based on age and risk factors.  Trial off ramipril, monitor BP.   Trial off statin - note FH contributing, trial 3 months to test response.  Change Protonix to Nexium.  If not working, will need to reconnect with GI.    EMR Dragon/Transcription disclaimer:    Much of this encounter note is an electronic transcription/translation of spoken language to printed text.  The electronic translation of spoken language may permit erroneous, or at times, nonsensical words or phrases to be inadvertently transcribed.  Although I have reviewed the note for such errors some may still exist.

## 2019-10-30 LAB
ALBUMIN SERPL-MCNC: 5.2 G/DL (ref 3.5–5.2)
ALBUMIN/GLOB SERPL: 2.1 G/DL
ALP SERPL-CCNC: 51 U/L (ref 39–117)
ALT SERPL-CCNC: 28 U/L (ref 1–41)
APPEARANCE UR: CLEAR
AST SERPL-CCNC: 26 U/L (ref 1–40)
BACTERIA #/AREA URNS HPF: NORMAL /HPF
BASOPHILS # BLD AUTO: 0.04 10*3/MM3 (ref 0–0.2)
BASOPHILS NFR BLD AUTO: 0.9 % (ref 0–1.5)
BILIRUB SERPL-MCNC: 0.7 MG/DL (ref 0.2–1.2)
BILIRUB UR QL STRIP: NEGATIVE
BUN SERPL-MCNC: 17 MG/DL (ref 6–20)
BUN/CREAT SERPL: 15 (ref 7–25)
CALCIUM SERPL-MCNC: 9.9 MG/DL (ref 8.6–10.5)
CHLORIDE SERPL-SCNC: 99 MMOL/L (ref 98–107)
CHOLEST SERPL-MCNC: 183 MG/DL (ref 0–200)
CHOLEST/HDLC SERPL: 3.16 {RATIO}
CO2 SERPL-SCNC: 26.9 MMOL/L (ref 22–29)
COLOR UR: YELLOW
CREAT SERPL-MCNC: 1.13 MG/DL (ref 0.76–1.27)
EOSINOPHIL # BLD AUTO: 0.1 10*3/MM3 (ref 0–0.4)
EOSINOPHIL NFR BLD AUTO: 2.3 % (ref 0.3–6.2)
EPI CELLS #/AREA URNS HPF: NORMAL /HPF
ERYTHROCYTE [DISTWIDTH] IN BLOOD BY AUTOMATED COUNT: 12.8 % (ref 12.3–15.4)
GLOBULIN SER CALC-MCNC: 2.5 GM/DL
GLUCOSE SERPL-MCNC: 84 MG/DL (ref 65–99)
GLUCOSE UR QL: NEGATIVE
HCT VFR BLD AUTO: 42.8 % (ref 37.5–51)
HDLC SERPL-MCNC: 58 MG/DL (ref 40–60)
HGB BLD-MCNC: 14.3 G/DL (ref 13–17.7)
HGB UR QL STRIP: NEGATIVE
IMM GRANULOCYTES # BLD AUTO: 0.01 10*3/MM3 (ref 0–0.05)
IMM GRANULOCYTES NFR BLD AUTO: 0.2 % (ref 0–0.5)
KETONES UR QL STRIP: NEGATIVE
LDLC SERPL CALC-MCNC: 109 MG/DL (ref 0–100)
LEUKOCYTE ESTERASE UR QL STRIP: NEGATIVE
LYMPHOCYTES # BLD AUTO: 1.75 10*3/MM3 (ref 0.7–3.1)
LYMPHOCYTES NFR BLD AUTO: 39.7 % (ref 19.6–45.3)
MCH RBC QN AUTO: 30.6 PG (ref 26.6–33)
MCHC RBC AUTO-ENTMCNC: 33.4 G/DL (ref 31.5–35.7)
MCV RBC AUTO: 91.5 FL (ref 79–97)
MICRO URNS: NORMAL
MICRO URNS: NORMAL
MONOCYTES # BLD AUTO: 0.5 10*3/MM3 (ref 0.1–0.9)
MONOCYTES NFR BLD AUTO: 11.3 % (ref 5–12)
MUCOUS THREADS URNS QL MICRO: PRESENT /HPF
NEUTROPHILS # BLD AUTO: 2.01 10*3/MM3 (ref 1.7–7)
NEUTROPHILS NFR BLD AUTO: 45.6 % (ref 42.7–76)
NITRITE UR QL STRIP: NEGATIVE
NRBC BLD AUTO-RTO: 0.2 /100 WBC (ref 0–0.2)
PH UR STRIP: 7.5 [PH] (ref 5–7.5)
PLATELET # BLD AUTO: 268 10*3/MM3 (ref 140–450)
POTASSIUM SERPL-SCNC: 4.7 MMOL/L (ref 3.5–5.2)
PROT SERPL-MCNC: 7.7 G/DL (ref 6–8.5)
PROT UR QL STRIP: NEGATIVE
RBC # BLD AUTO: 4.68 10*6/MM3 (ref 4.14–5.8)
RBC #/AREA URNS HPF: NORMAL /HPF
SODIUM SERPL-SCNC: 142 MMOL/L (ref 136–145)
SP GR UR: 1.02 (ref 1–1.03)
TRIGL SERPL-MCNC: 78 MG/DL (ref 0–150)
TSH SERPL DL<=0.005 MIU/L-ACNC: 2.82 UIU/ML (ref 0.45–4.5)
URINALYSIS REFLEX: NORMAL
UROBILINOGEN UR STRIP-MCNC: 0.2 MG/DL (ref 0.2–1)
VLDLC SERPL CALC-MCNC: 15.6 MG/DL
WBC # BLD AUTO: 4.41 10*3/MM3 (ref 3.4–10.8)
WBC #/AREA URNS HPF: NORMAL /HPF

## 2019-11-20 RX ORDER — PANTOPRAZOLE SODIUM 40 MG/1
TABLET, DELAYED RELEASE ORAL
Qty: 90 TABLET | Refills: 2 | Status: SHIPPED | OUTPATIENT
Start: 2019-11-20 | End: 2020-02-05

## 2020-02-05 ENCOUNTER — OFFICE VISIT (OUTPATIENT)
Dept: SPORTS MEDICINE | Facility: CLINIC | Age: 50
End: 2020-02-05

## 2020-02-05 VITALS
WEIGHT: 178 LBS | BODY MASS INDEX: 24.92 KG/M2 | HEIGHT: 71 IN | HEART RATE: 54 BPM | SYSTOLIC BLOOD PRESSURE: 126 MMHG | OXYGEN SATURATION: 99 % | DIASTOLIC BLOOD PRESSURE: 80 MMHG

## 2020-02-05 DIAGNOSIS — E78.2 MIXED HYPERLIPIDEMIA: ICD-10-CM

## 2020-02-05 DIAGNOSIS — K21.9 GASTROESOPHAGEAL REFLUX DISEASE, ESOPHAGITIS PRESENCE NOT SPECIFIED: ICD-10-CM

## 2020-02-05 DIAGNOSIS — Z82.49 FAMILY HISTORY OF CORONARY ARTERIOSCLEROSIS: ICD-10-CM

## 2020-02-05 DIAGNOSIS — I10 ESSENTIAL HYPERTENSION: Primary | ICD-10-CM

## 2020-02-05 PROCEDURE — 99214 OFFICE O/P EST MOD 30 MIN: CPT | Performed by: FAMILY MEDICINE

## 2020-02-05 NOTE — PROGRESS NOTES
"Alon is a 49 y.o. year old male follow up on a problem familiar to this examiner.     Chief Complaint   Patient presents with   • Hypertension   • Hyperlipidemia       History of Present Illness   HPI F/u HTN/HLD/GERD    HTN - stable off meds, home checks stable  GERD - doing well with change to nexium, seems to be working better  HLD - feelnig fine off meds, continues healthy diet and exercise wtihout any specific plan    I have reviewed the patient's medical, family, and social history in detail and updated the computerized patient record.    Review of Systems   Constitutional: Negative.    Respiratory: Negative.    Cardiovascular: Negative.        /80   Pulse 54   Ht 180.3 cm (70.98\")   Wt 80.7 kg (178 lb)   SpO2 99%   BMI 24.84 kg/m²      Physical Exam   Constitutional: He appears well-developed and well-nourished.   Eyes: Pupils are equal, round, and reactive to light.   Cardiovascular: Normal heart sounds.   Pulmonary/Chest: Effort normal and breath sounds normal.   Psychiatric: He has a normal mood and affect.   Vitals reviewed.        Current Outpatient Medications:   •  esomeprazole (nexIUM) 40 MG capsule, Take 1 capsule by mouth Every Morning Before Breakfast., Disp: 90 capsule, Rfl: 3  •  Multiple Vitamins-Minerals (MULTIVITAMIN ADULT PO), Take 1 tablet by mouth Daily., Disp: , Rfl:      Diagnoses and all orders for this visit:    Essential hypertension  -     CT Cardiac Calcium Score Without Dye; Future    Mixed hyperlipidemia  -     Lipid Panel  -     Comprehensive Metabolic Panel  -     CT Cardiac Calcium Score Without Dye; Future    Gastroesophageal reflux disease, esophagitis presence not specified    Family history of coronary arteriosclerosis  -     CT Cardiac Calcium Score Without Dye; Future       Currently appears to be doing well.  We will check labs as above and CT calcium score to determine his long-term statin need.  Blood pressure doing well without medications.  Continue Nexium " for well-controlled GERD      EMR Dragon/Transcription disclaimer:    Much of this encounter note is an electronic transcription/translation of spoken language to printed text.  The electronic translation of spoken language may permit erroneous, or at times, nonsensical words or phrases to be inadvertently transcribed.  Although I have reviewed the note for such errors some may still exist.

## 2020-02-06 LAB
ALBUMIN SERPL-MCNC: 4.8 G/DL (ref 3.5–5.2)
ALBUMIN/GLOB SERPL: 1.8 G/DL
ALP SERPL-CCNC: 57 U/L (ref 39–117)
ALT SERPL-CCNC: 30 U/L (ref 1–41)
AST SERPL-CCNC: 31 U/L (ref 1–40)
BILIRUB SERPL-MCNC: 0.7 MG/DL (ref 0.2–1.2)
BUN SERPL-MCNC: 17 MG/DL (ref 6–20)
BUN/CREAT SERPL: 17.2 (ref 7–25)
CALCIUM SERPL-MCNC: 9.7 MG/DL (ref 8.6–10.5)
CHLORIDE SERPL-SCNC: 99 MMOL/L (ref 98–107)
CHOLEST SERPL-MCNC: 244 MG/DL (ref 0–200)
CO2 SERPL-SCNC: 27.7 MMOL/L (ref 22–29)
CREAT SERPL-MCNC: 0.99 MG/DL (ref 0.76–1.27)
GLOBULIN SER CALC-MCNC: 2.6 GM/DL
GLUCOSE SERPL-MCNC: 83 MG/DL (ref 65–99)
HDLC SERPL-MCNC: 52 MG/DL (ref 40–60)
LDLC SERPL CALC-MCNC: 157 MG/DL (ref 0–100)
POTASSIUM SERPL-SCNC: 4.8 MMOL/L (ref 3.5–5.2)
PROT SERPL-MCNC: 7.4 G/DL (ref 6–8.5)
SODIUM SERPL-SCNC: 140 MMOL/L (ref 136–145)
TRIGL SERPL-MCNC: 174 MG/DL (ref 0–150)
VLDLC SERPL CALC-MCNC: 34.8 MG/DL

## 2020-02-25 ENCOUNTER — HOSPITAL ENCOUNTER (OUTPATIENT)
Dept: CT IMAGING | Facility: HOSPITAL | Age: 50
Discharge: HOME | End: 2020-02-25
Admitting: FAMILY MEDICINE

## 2020-02-25 DIAGNOSIS — E78.2 MIXED HYPERLIPIDEMIA: ICD-10-CM

## 2020-02-25 DIAGNOSIS — I10 ESSENTIAL HYPERTENSION: ICD-10-CM

## 2020-02-25 DIAGNOSIS — Z82.49 FAMILY HISTORY OF CORONARY ARTERIOSCLEROSIS: ICD-10-CM

## 2020-02-25 PROCEDURE — 75571 CT HRT W/O DYE W/CA TEST: CPT

## 2020-03-03 ENCOUNTER — TELEPHONE (OUTPATIENT)
Dept: SPORTS MEDICINE | Facility: CLINIC | Age: 50
End: 2020-03-03

## 2020-10-19 DIAGNOSIS — K21.9 GASTROESOPHAGEAL REFLUX DISEASE: ICD-10-CM

## 2020-10-19 RX ORDER — ESOMEPRAZOLE MAGNESIUM 40 MG/1
CAPSULE, DELAYED RELEASE ORAL
Qty: 90 CAPSULE | Refills: 0 | Status: SHIPPED | OUTPATIENT
Start: 2020-10-19 | End: 2021-02-04 | Stop reason: SDUPTHER

## 2021-02-04 ENCOUNTER — OFFICE VISIT (OUTPATIENT)
Dept: SPORTS MEDICINE | Facility: CLINIC | Age: 51
End: 2021-02-04

## 2021-02-04 VITALS
TEMPERATURE: 98.5 F | HEART RATE: 65 BPM | OXYGEN SATURATION: 97 % | WEIGHT: 182.4 LBS | BODY MASS INDEX: 25.54 KG/M2 | DIASTOLIC BLOOD PRESSURE: 85 MMHG | RESPIRATION RATE: 14 BRPM | SYSTOLIC BLOOD PRESSURE: 125 MMHG | HEIGHT: 71 IN

## 2021-02-04 DIAGNOSIS — Z12.5 SCREENING FOR PROSTATE CANCER: ICD-10-CM

## 2021-02-04 DIAGNOSIS — K21.9 GASTROESOPHAGEAL REFLUX DISEASE: ICD-10-CM

## 2021-02-04 DIAGNOSIS — E78.2 MIXED HYPERLIPIDEMIA: ICD-10-CM

## 2021-02-04 DIAGNOSIS — Z00.00 ANNUAL PHYSICAL EXAM: Primary | ICD-10-CM

## 2021-02-04 PROCEDURE — 99396 PREV VISIT EST AGE 40-64: CPT | Performed by: FAMILY MEDICINE

## 2021-02-04 RX ORDER — ESOMEPRAZOLE MAGNESIUM 40 MG/1
40 CAPSULE, DELAYED RELEASE ORAL
Qty: 90 CAPSULE | Refills: 3 | Status: SHIPPED | OUTPATIENT
Start: 2021-02-04 | End: 2022-02-01

## 2021-02-04 NOTE — PROGRESS NOTES
"Alon Rivera is here today for an annual physical exam.     Eating a healthy diet. Exercising routinely. Walking 3-4 miles daily.   GERD controlled    PHQ-2 Depression Screening  Little interest or pleasure in doing things? 0   Feeling down, depressed, or hopeless? 0   PHQ-2 Total Score 0        Health Maintenance   Topic Date Due   • HEPATITIS C SCREENING  07/26/2017   • ANNUAL PHYSICAL  10/30/2020   • LIPID PANEL  02/05/2021   • TDAP/TD VACCINES (2 - Td) 10/29/2023   • COLONOSCOPY  01/11/2029   • INFLUENZA VACCINE  Completed   • Pneumococcal Vaccine 0-64  Aged Out   • MENINGOCOCCAL VACCINE  Aged Out       Review of Systems    /85 (BP Location: Left arm, Patient Position: Sitting, Cuff Size: Adult)   Pulse 65   Temp 98.5 °F (36.9 °C) (Oral)   Resp 14   Ht 180.3 cm (71\")   Wt 82.7 kg (182 lb 6.4 oz)   SpO2 97%   BMI 25.44 kg/m²      Physical Exam    Vital signs reviewed.  General appearance: No acute distress  Eyes: conjunctiva clear without erythema; pupils equally round and reactive  ENT: external ears normal; hearing normal  Neck: supple; no thyromegaly  CV: normal rate and rhythm; no peripheral edema  Respiratory: normal respiratory effort; lungs clear to auscultation bilaterally  MSK: normal gait and station; no focal joint deformity or swelling  Skin: no rash or wounds; normal turgor  Neuro: cranial nerves 2-12 grossly intact; normal sensation to light touch  Psych: mood and affect normal; recent and remote memory intact       Current Outpatient Medications:   •  esomeprazole (nexIUM) 40 MG capsule, Take 1 capsule by mouth Every Morning Before Breakfast., Disp: 90 capsule, Rfl: 3  •  Multiple Vitamins-Minerals (MULTIVITAMIN ADULT PO), Take 1 tablet by mouth Daily., Disp: , Rfl:     Diagnoses and all orders for this visit:    1. Annual physical exam (Primary)  -     CBC & Differential  -     Comprehensive Metabolic Panel  -     Lipid Panel With / Chol / HDL Ratio  -     Thyroid Cascade Profile  -  "    Urinalysis With Culture If Indicated -  -     PSA Screen  -     Hepatitis C Antibody    2. Gastroesophageal reflux disease  -     esomeprazole (nexIUM) 40 MG capsule; Take 1 capsule by mouth Every Morning Before Breakfast.  Dispense: 90 capsule; Refill: 3    3. Mixed hyperlipidemia  -     Lipid Panel With / Chol / HDL Ratio    4. Screening for prostate cancer  -     PSA Screen        Encourage healthy diet and exercise.  Encourage patient to stay up to date on screening examinations as indicated based on age and risk factors.  Discussed he may need to resume statin, he is comfortable with that.    EMR Dragon/Transcription disclaimer:    Much of this encounter note is an electronic transcription/translation of spoken language to printed text.  The electronic translation of spoken language may permit erroneous, or at times, nonsensical words or phrases to be inadvertently transcribed.  Although I have reviewed the note for such errors some may still exist.

## 2021-02-05 LAB
ALBUMIN SERPL-MCNC: 4.8 G/DL (ref 3.5–5.2)
ALBUMIN/GLOB SERPL: 1.8 G/DL
ALP SERPL-CCNC: 54 U/L (ref 39–117)
ALT SERPL-CCNC: 33 U/L (ref 1–41)
APPEARANCE UR: CLEAR
AST SERPL-CCNC: 30 U/L (ref 1–40)
BACTERIA #/AREA URNS HPF: NORMAL /HPF
BASOPHILS # BLD AUTO: 0.04 10*3/MM3 (ref 0–0.2)
BASOPHILS NFR BLD AUTO: 0.9 % (ref 0–1.5)
BILIRUB SERPL-MCNC: 0.5 MG/DL (ref 0–1.2)
BILIRUB UR QL STRIP: NEGATIVE
BUN SERPL-MCNC: 15 MG/DL (ref 6–20)
BUN/CREAT SERPL: 14.6 (ref 7–25)
CALCIUM SERPL-MCNC: 10 MG/DL (ref 8.6–10.5)
CHLORIDE SERPL-SCNC: 103 MMOL/L (ref 98–107)
CHOLEST SERPL-MCNC: 263 MG/DL (ref 0–200)
CHOLEST/HDLC SERPL: 4.96 {RATIO}
CO2 SERPL-SCNC: 26 MMOL/L (ref 22–29)
COLOR UR: YELLOW
CREAT SERPL-MCNC: 1.03 MG/DL (ref 0.76–1.27)
EOSINOPHIL # BLD AUTO: 0.09 10*3/MM3 (ref 0–0.4)
EOSINOPHIL NFR BLD AUTO: 2.1 % (ref 0.3–6.2)
EPI CELLS #/AREA URNS HPF: NORMAL /HPF (ref 0–10)
ERYTHROCYTE [DISTWIDTH] IN BLOOD BY AUTOMATED COUNT: 12.6 % (ref 12.3–15.4)
GLOBULIN SER CALC-MCNC: 2.6 GM/DL
GLUCOSE SERPL-MCNC: 83 MG/DL (ref 65–99)
GLUCOSE UR QL: NEGATIVE
HCT VFR BLD AUTO: 44.6 % (ref 37.5–51)
HCV AB S/CO SERPL IA: <0.1 S/CO RATIO (ref 0–0.9)
HDLC SERPL-MCNC: 53 MG/DL (ref 40–60)
HGB BLD-MCNC: 15.2 G/DL (ref 13–17.7)
HGB UR QL STRIP: NEGATIVE
IMM GRANULOCYTES # BLD AUTO: 0.01 10*3/MM3 (ref 0–0.05)
IMM GRANULOCYTES NFR BLD AUTO: 0.2 % (ref 0–0.5)
KETONES UR QL STRIP: NEGATIVE
LDLC SERPL CALC-MCNC: 190 MG/DL (ref 0–100)
LEUKOCYTE ESTERASE UR QL STRIP: NEGATIVE
LYMPHOCYTES # BLD AUTO: 1.65 10*3/MM3 (ref 0.7–3.1)
LYMPHOCYTES NFR BLD AUTO: 38.5 % (ref 19.6–45.3)
MCH RBC QN AUTO: 30.6 PG (ref 26.6–33)
MCHC RBC AUTO-ENTMCNC: 34.1 G/DL (ref 31.5–35.7)
MCV RBC AUTO: 89.7 FL (ref 79–97)
MICRO URNS: NORMAL
MICRO URNS: NORMAL
MONOCYTES # BLD AUTO: 0.48 10*3/MM3 (ref 0.1–0.9)
MONOCYTES NFR BLD AUTO: 11.2 % (ref 5–12)
MUCOUS THREADS URNS QL MICRO: PRESENT /HPF
NEUTROPHILS # BLD AUTO: 2.02 10*3/MM3 (ref 1.7–7)
NEUTROPHILS NFR BLD AUTO: 47.1 % (ref 42.7–76)
NITRITE UR QL STRIP: NEGATIVE
NRBC BLD AUTO-RTO: 0 /100 WBC (ref 0–0.2)
PH UR STRIP: 6 [PH] (ref 5–7.5)
PLATELET # BLD AUTO: 268 10*3/MM3 (ref 140–450)
POTASSIUM SERPL-SCNC: 5.1 MMOL/L (ref 3.5–5.2)
PROT SERPL-MCNC: 7.4 G/DL (ref 6–8.5)
PROT UR QL STRIP: NEGATIVE
PSA SERPL-MCNC: 0.47 NG/ML (ref 0–4)
RBC # BLD AUTO: 4.97 10*6/MM3 (ref 4.14–5.8)
RBC #/AREA URNS HPF: NORMAL /HPF (ref 0–2)
SODIUM SERPL-SCNC: 140 MMOL/L (ref 136–145)
SP GR UR: 1.02 (ref 1–1.03)
TRIGL SERPL-MCNC: 115 MG/DL (ref 0–150)
TSH SERPL DL<=0.005 MIU/L-ACNC: 2.68 UIU/ML (ref 0.45–4.5)
URINALYSIS REFLEX: NORMAL
UROBILINOGEN UR STRIP-MCNC: 0.2 MG/DL (ref 0.2–1)
VLDLC SERPL CALC-MCNC: 20 MG/DL (ref 5–40)
WBC # BLD AUTO: 4.29 10*3/MM3 (ref 3.4–10.8)
WBC #/AREA URNS HPF: NORMAL /HPF (ref 0–5)

## 2021-02-08 RX ORDER — ATORVASTATIN CALCIUM 20 MG/1
20 TABLET, FILM COATED ORAL DAILY
Qty: 90 TABLET | Refills: 3 | Status: SHIPPED | OUTPATIENT
Start: 2021-02-08 | End: 2022-02-01

## 2021-02-08 NOTE — PROGRESS NOTES
Called and spoke to patient, he received his results on his MyChart which he did verbally understand Dr. Prajapati's response and recommendations. Confirmed with patient that a new prescription was ent in for him, he verbalized understanding for this as well.  -Sadia

## 2021-04-02 ENCOUNTER — BULK ORDERING (OUTPATIENT)
Dept: CASE MANAGEMENT | Facility: OTHER | Age: 51
End: 2021-04-02

## 2021-04-02 DIAGNOSIS — Z23 IMMUNIZATION DUE: ICD-10-CM

## 2022-01-31 DIAGNOSIS — K21.9 GASTROESOPHAGEAL REFLUX DISEASE: ICD-10-CM

## 2022-02-01 RX ORDER — ATORVASTATIN CALCIUM 20 MG/1
TABLET, FILM COATED ORAL
Qty: 30 TABLET | Refills: 1 | Status: SHIPPED | OUTPATIENT
Start: 2022-02-01 | End: 2022-04-04

## 2022-02-01 RX ORDER — ESOMEPRAZOLE MAGNESIUM 40 MG/1
CAPSULE, DELAYED RELEASE ORAL
Qty: 30 CAPSULE | Refills: 1 | Status: SHIPPED | OUTPATIENT
Start: 2022-02-01 | End: 2022-04-07

## 2022-03-08 ENCOUNTER — OFFICE VISIT (OUTPATIENT)
Dept: SPORTS MEDICINE | Facility: CLINIC | Age: 52
End: 2022-03-08

## 2022-03-08 VITALS
HEART RATE: 66 BPM | OXYGEN SATURATION: 99 % | BODY MASS INDEX: 25.48 KG/M2 | RESPIRATION RATE: 16 BRPM | TEMPERATURE: 96.6 F | SYSTOLIC BLOOD PRESSURE: 126 MMHG | WEIGHT: 182 LBS | DIASTOLIC BLOOD PRESSURE: 78 MMHG | HEIGHT: 71 IN

## 2022-03-08 DIAGNOSIS — Z12.5 SCREENING FOR PROSTATE CANCER: ICD-10-CM

## 2022-03-08 DIAGNOSIS — Z00.00 ANNUAL PHYSICAL EXAM: Primary | ICD-10-CM

## 2022-03-08 PROCEDURE — 99396 PREV VISIT EST AGE 40-64: CPT | Performed by: FAMILY MEDICINE

## 2022-03-08 NOTE — PROGRESS NOTES
"Alon Rivera is here today for an annual physical exam.     Diet: Well Balanced Diet  Exercise: walking and weekly basketball      PHQ-2 Depression Screening  Little interest or pleasure in doing things? 0-->not at all   Feeling down, depressed, or hopeless? 0-->not at all   PHQ-2 Total Score 0       Health Maintenance   Topic Date Due   • INFLUENZA VACCINE  08/01/2021   • ZOSTER VACCINE (1 of 2) Never done   • LIPID PANEL  02/04/2022   • ANNUAL PHYSICAL  02/05/2022   • COVID-19 Vaccine (3 - Booster for Moderna series) 04/18/2022   • TDAP/TD VACCINES (2 - Td or Tdap) 10/29/2023   • COLORECTAL CANCER SCREENING  01/11/2029   • HEPATITIS C SCREENING  Completed   • Pneumococcal Vaccine 0-64  Aged Out       Review of Systems    /78 (BP Location: Left arm, Patient Position: Sitting, Cuff Size: Adult)   Pulse 66   Temp 96.6 °F (35.9 °C)   Resp 16   Ht 180.3 cm (71\")   Wt 82.6 kg (182 lb)   SpO2 99%   BMI 25.38 kg/m²      Physical Exam    Vital signs reviewed.  General appearance: No acute distress  Eyes: conjunctiva clear without erythema; pupils equally round and reactive  ENT: external ears normal; hearing normal  Neck: supple; no thyromegaly  CV: normal rate and rhythm; no peripheral edema  Respiratory: normal respiratory effort; lungs clear to auscultation bilaterally  MSK: normal gait and station; no focal joint deformity or swelling  Skin: no rash or wounds; normal turgor  Neuro: cranial nerves 2-12 grossly intact; normal sensation to light touch  Psych: mood and affect normal; recent and remote memory intact      Current Outpatient Medications:   •  atorvastatin (LIPITOR) 20 MG tablet, TAKE ONE TABLET BY MOUTH DAILY, Disp: 30 tablet, Rfl: 1  •  esomeprazole (nexIUM) 40 MG capsule, TAKE ONE CAPSULE BY MOUTH EVERY MORNING BEFORE BREAKFAST, Disp: 30 capsule, Rfl: 1  •  Multiple Vitamins-Minerals (MULTIVITAMIN ADULT PO), Take 1 tablet by mouth Daily., Disp: , Rfl:     Diagnoses and all orders for this " visit:    1. Annual physical exam (Primary)  -     CBC & Differential  -     Comprehensive Metabolic Panel  -     Lipid Panel With / Chol / HDL Ratio  -     Thyroid Cascade Profile  -     Urinalysis With Culture If Indicated -  -     PSA Screen    2. Screening for prostate cancer  -     PSA Screen        Encourage healthy diet and exercise.  Encourage patient to stay up to date on screening examinations as indicated based on age and risk factors.  Overall well without concerns. Rec shingrix.

## 2022-03-09 LAB
ALBUMIN SERPL-MCNC: 4.6 G/DL (ref 4–5)
ALBUMIN/GLOB SERPL: 1.9 {RATIO} (ref 1.2–2.2)
ALP SERPL-CCNC: 54 IU/L (ref 44–121)
ALT SERPL-CCNC: 50 IU/L (ref 0–44)
APPEARANCE UR: CLEAR
AST SERPL-CCNC: 35 IU/L (ref 0–40)
BACTERIA #/AREA URNS HPF: NORMAL /[HPF]
BASOPHILS # BLD AUTO: 0 X10E3/UL (ref 0–0.2)
BASOPHILS NFR BLD AUTO: 1 %
BILIRUB SERPL-MCNC: 0.5 MG/DL (ref 0–1.2)
BILIRUB UR QL STRIP: NEGATIVE
BUN SERPL-MCNC: 15 MG/DL (ref 6–24)
BUN/CREAT SERPL: 14 (ref 9–20)
CALCIUM SERPL-MCNC: 9.2 MG/DL (ref 8.7–10.2)
CASTS URNS QL MICRO: NORMAL /LPF
CHLORIDE SERPL-SCNC: 105 MMOL/L (ref 96–106)
CHOLEST SERPL-MCNC: 185 MG/DL (ref 100–199)
CHOLEST/HDLC SERPL: 3.1 RATIO (ref 0–5)
CO2 SERPL-SCNC: 24 MMOL/L (ref 20–29)
COLOR UR: YELLOW
CREAT SERPL-MCNC: 1.06 MG/DL (ref 0.76–1.27)
EGFR GENE MUT ANL BLD/T: 85 ML/MIN/1.73
EOSINOPHIL # BLD AUTO: 0.1 X10E3/UL (ref 0–0.4)
EOSINOPHIL NFR BLD AUTO: 2 %
EPI CELLS #/AREA URNS HPF: NORMAL /HPF (ref 0–10)
ERYTHROCYTE [DISTWIDTH] IN BLOOD BY AUTOMATED COUNT: 13.2 % (ref 11.6–15.4)
GLOBULIN SER CALC-MCNC: 2.4 G/DL (ref 1.5–4.5)
GLUCOSE SERPL-MCNC: 88 MG/DL (ref 65–99)
GLUCOSE UR QL STRIP: NEGATIVE
HCT VFR BLD AUTO: 43.5 % (ref 37.5–51)
HDLC SERPL-MCNC: 59 MG/DL
HGB BLD-MCNC: 14.7 G/DL (ref 13–17.7)
HGB UR QL STRIP: NEGATIVE
IMM GRANULOCYTES # BLD AUTO: 0 X10E3/UL (ref 0–0.1)
IMM GRANULOCYTES NFR BLD AUTO: 0 %
KETONES UR QL STRIP: NEGATIVE
LDLC SERPL CALC-MCNC: 112 MG/DL (ref 0–99)
LEUKOCYTE ESTERASE UR QL STRIP: NEGATIVE
LYMPHOCYTES # BLD AUTO: 1.5 X10E3/UL (ref 0.7–3.1)
LYMPHOCYTES NFR BLD AUTO: 30 %
MCH RBC QN AUTO: 30.6 PG (ref 26.6–33)
MCHC RBC AUTO-ENTMCNC: 33.8 G/DL (ref 31.5–35.7)
MCV RBC AUTO: 91 FL (ref 79–97)
MICRO URNS: NORMAL
MICRO URNS: NORMAL
MONOCYTES # BLD AUTO: 0.5 X10E3/UL (ref 0.1–0.9)
MONOCYTES NFR BLD AUTO: 10 %
NEUTROPHILS # BLD AUTO: 2.8 X10E3/UL (ref 1.4–7)
NEUTROPHILS NFR BLD AUTO: 57 %
NITRITE UR QL STRIP: NEGATIVE
PH UR STRIP: 5.5 [PH] (ref 5–7.5)
PLATELET # BLD AUTO: 265 X10E3/UL (ref 150–450)
POTASSIUM SERPL-SCNC: 5 MMOL/L (ref 3.5–5.2)
PROT SERPL-MCNC: 7 G/DL (ref 6–8.5)
PROT UR QL STRIP: NEGATIVE
PSA SERPL-MCNC: 0.4 NG/ML (ref 0–4)
RBC # BLD AUTO: 4.8 X10E6/UL (ref 4.14–5.8)
RBC #/AREA URNS HPF: NORMAL /HPF (ref 0–2)
SODIUM SERPL-SCNC: 143 MMOL/L (ref 134–144)
SP GR UR STRIP: 1.02 (ref 1–1.03)
TRIGL SERPL-MCNC: 77 MG/DL (ref 0–149)
TSH SERPL DL<=0.005 MIU/L-ACNC: 3.44 UIU/ML (ref 0.45–4.5)
URINALYSIS REFLEX: NORMAL
UROBILINOGEN UR STRIP-MCNC: 0.2 MG/DL (ref 0.2–1)
VLDLC SERPL CALC-MCNC: 14 MG/DL (ref 5–40)
WBC # BLD AUTO: 4.9 X10E3/UL (ref 3.4–10.8)
WBC #/AREA URNS HPF: NORMAL /HPF (ref 0–5)

## 2022-04-04 RX ORDER — ATORVASTATIN CALCIUM 20 MG/1
TABLET, FILM COATED ORAL
Qty: 30 TABLET | Refills: 1 | Status: SHIPPED | OUTPATIENT
Start: 2022-04-04 | End: 2022-06-03

## 2022-04-07 DIAGNOSIS — K21.9 GASTROESOPHAGEAL REFLUX DISEASE: ICD-10-CM

## 2022-04-07 RX ORDER — ESOMEPRAZOLE MAGNESIUM 40 MG/1
CAPSULE, DELAYED RELEASE ORAL
Qty: 30 CAPSULE | Refills: 1 | Status: SHIPPED | OUTPATIENT
Start: 2022-04-07 | End: 2022-06-13

## 2022-06-03 RX ORDER — ATORVASTATIN CALCIUM 20 MG/1
TABLET, FILM COATED ORAL
Qty: 30 TABLET | Refills: 1 | Status: SHIPPED | OUTPATIENT
Start: 2022-06-03 | End: 2022-08-02

## 2022-06-12 DIAGNOSIS — K21.9 GASTROESOPHAGEAL REFLUX DISEASE: ICD-10-CM

## 2022-06-13 RX ORDER — ESOMEPRAZOLE MAGNESIUM 40 MG/1
CAPSULE, DELAYED RELEASE ORAL
Qty: 90 CAPSULE | Refills: 3 | Status: SHIPPED | OUTPATIENT
Start: 2022-06-13

## 2022-08-02 RX ORDER — ATORVASTATIN CALCIUM 20 MG/1
TABLET, FILM COATED ORAL
Qty: 30 TABLET | Refills: 1 | Status: SHIPPED | OUTPATIENT
Start: 2022-08-02 | End: 2022-10-03

## 2022-10-03 RX ORDER — ATORVASTATIN CALCIUM 20 MG/1
TABLET, FILM COATED ORAL
Qty: 30 TABLET | Refills: 1 | Status: SHIPPED | OUTPATIENT
Start: 2022-10-03 | End: 2022-11-30

## 2022-11-30 RX ORDER — ATORVASTATIN CALCIUM 20 MG/1
TABLET, FILM COATED ORAL
Qty: 30 TABLET | Refills: 1 | Status: SHIPPED | OUTPATIENT
Start: 2022-11-30 | End: 2023-01-31

## 2023-01-31 RX ORDER — ATORVASTATIN CALCIUM 20 MG/1
TABLET, FILM COATED ORAL
Qty: 30 TABLET | Refills: 1 | Status: SHIPPED | OUTPATIENT
Start: 2023-01-31 | End: 2023-03-31

## 2023-03-31 RX ORDER — ATORVASTATIN CALCIUM 20 MG/1
TABLET, FILM COATED ORAL
Qty: 30 TABLET | Refills: 1 | Status: SHIPPED | OUTPATIENT
Start: 2023-03-31

## 2023-04-18 ENCOUNTER — TELEPHONE (OUTPATIENT)
Dept: SPORTS MEDICINE | Facility: CLINIC | Age: 53
End: 2023-04-18
Payer: COMMERCIAL

## 2023-04-18 DIAGNOSIS — Z00.00 ANNUAL PHYSICAL EXAM: Primary | ICD-10-CM

## 2023-04-18 DIAGNOSIS — E78.2 MIXED HYPERLIPIDEMIA: ICD-10-CM

## 2023-04-18 DIAGNOSIS — Z12.5 ENCOUNTER FOR SCREENING FOR MALIGNANT NEOPLASM OF PROSTATE: ICD-10-CM

## 2023-04-18 NOTE — TELEPHONE ENCOUNTER
I have placed the order in the chart for patient to get labs drawn. No further action needed at this time. Thank you!    -SANCHEZ Arceo

## 2023-04-18 NOTE — TELEPHONE ENCOUNTER
Caller: Alon Rivera    Relationship: Self    Best call back number: [phone]    What orders are you requesting (i.e. lab or imaging): LAB ORDERS     Additional notes: PT IS SCHD 04/26 FOR A PHYSICAL AND HE IS NEEDING AN ORDER FOR BLOOD TEST BEFORE TOMORROW MORNING. PLEASE CALL BACK WHEN DONE.

## 2023-04-19 ENCOUNTER — LAB (OUTPATIENT)
Dept: LAB | Facility: HOSPITAL | Age: 53
End: 2023-04-19
Payer: COMMERCIAL

## 2023-04-19 LAB
ALBUMIN SERPL-MCNC: 4.6 G/DL (ref 3.5–5.2)
ALBUMIN/GLOB SERPL: 1.5 G/DL
ALP SERPL-CCNC: 60 U/L (ref 39–117)
ALT SERPL W P-5'-P-CCNC: 48 U/L (ref 1–41)
ANION GAP SERPL CALCULATED.3IONS-SCNC: 10 MMOL/L (ref 5–15)
AST SERPL-CCNC: 35 U/L (ref 1–40)
BACTERIA UR QL AUTO: ABNORMAL /HPF
BASOPHILS # BLD AUTO: 0.04 10*3/MM3 (ref 0–0.2)
BASOPHILS NFR BLD AUTO: 0.8 % (ref 0–1.5)
BILIRUB SERPL-MCNC: 0.6 MG/DL (ref 0–1.2)
BILIRUB UR QL STRIP: NEGATIVE
BUN SERPL-MCNC: 18 MG/DL (ref 6–20)
BUN/CREAT SERPL: 16.4 (ref 7–25)
CALCIUM SPEC-SCNC: 9.5 MG/DL (ref 8.6–10.5)
CHLORIDE SERPL-SCNC: 104 MMOL/L (ref 98–107)
CHOLEST SERPL-MCNC: 217 MG/DL (ref 0–200)
CLARITY UR: CLEAR
CO2 SERPL-SCNC: 27 MMOL/L (ref 22–29)
COLOR UR: YELLOW
CREAT SERPL-MCNC: 1.1 MG/DL (ref 0.76–1.27)
DEPRECATED RDW RBC AUTO: 43.5 FL (ref 37–54)
EGFRCR SERPLBLD CKD-EPI 2021: 81.3 ML/MIN/1.73
EOSINOPHIL # BLD AUTO: 0.12 10*3/MM3 (ref 0–0.4)
EOSINOPHIL NFR BLD AUTO: 2.4 % (ref 0.3–6.2)
ERYTHROCYTE [DISTWIDTH] IN BLOOD BY AUTOMATED COUNT: 12.9 % (ref 12.3–15.4)
GLOBULIN UR ELPH-MCNC: 3 GM/DL
GLUCOSE SERPL-MCNC: 127 MG/DL (ref 65–99)
GLUCOSE UR STRIP-MCNC: NEGATIVE MG/DL
HBA1C MFR BLD: 5.3 % (ref 4.8–5.6)
HCT VFR BLD AUTO: 43.5 % (ref 37.5–51)
HDLC SERPL-MCNC: 51 MG/DL (ref 40–60)
HGB BLD-MCNC: 15.1 G/DL (ref 13–17.7)
HGB UR QL STRIP.AUTO: ABNORMAL
HYALINE CASTS UR QL AUTO: ABNORMAL /LPF
IMM GRANULOCYTES # BLD AUTO: 0.01 10*3/MM3 (ref 0–0.05)
IMM GRANULOCYTES NFR BLD AUTO: 0.2 % (ref 0–0.5)
KETONES UR QL STRIP: NEGATIVE
LDLC SERPL CALC-MCNC: 131 MG/DL (ref 0–100)
LDLC/HDLC SERPL: 2.49 {RATIO}
LEUKOCYTE ESTERASE UR QL STRIP.AUTO: NEGATIVE
LYMPHOCYTES # BLD AUTO: 1.69 10*3/MM3 (ref 0.7–3.1)
LYMPHOCYTES NFR BLD AUTO: 33.6 % (ref 19.6–45.3)
MCH RBC QN AUTO: 31.9 PG (ref 26.6–33)
MCHC RBC AUTO-ENTMCNC: 34.7 G/DL (ref 31.5–35.7)
MCV RBC AUTO: 91.8 FL (ref 79–97)
MONOCYTES # BLD AUTO: 0.56 10*3/MM3 (ref 0.1–0.9)
MONOCYTES NFR BLD AUTO: 11.1 % (ref 5–12)
NEUTROPHILS NFR BLD AUTO: 2.61 10*3/MM3 (ref 1.7–7)
NEUTROPHILS NFR BLD AUTO: 51.9 % (ref 42.7–76)
NITRITE UR QL STRIP: NEGATIVE
NRBC BLD AUTO-RTO: 0 /100 WBC (ref 0–0.2)
PH UR STRIP.AUTO: 5.5 [PH] (ref 5–8)
PLATELET # BLD AUTO: 253 10*3/MM3 (ref 140–450)
PMV BLD AUTO: 10.2 FL (ref 6–12)
POTASSIUM SERPL-SCNC: 4.6 MMOL/L (ref 3.5–5.2)
PROT SERPL-MCNC: 7.6 G/DL (ref 6–8.5)
PROT UR QL STRIP: ABNORMAL
PSA SERPL-MCNC: 0.42 NG/ML (ref 0–4)
RBC # BLD AUTO: 4.74 10*6/MM3 (ref 4.14–5.8)
RBC # UR STRIP: ABNORMAL /HPF
REF LAB TEST METHOD: ABNORMAL
SODIUM SERPL-SCNC: 141 MMOL/L (ref 136–145)
SP GR UR STRIP: >=1.03 (ref 1–1.03)
SQUAMOUS #/AREA URNS HPF: ABNORMAL /HPF
TRIGL SERPL-MCNC: 196 MG/DL (ref 0–150)
UROBILINOGEN UR QL STRIP: ABNORMAL
VLDLC SERPL-MCNC: 35 MG/DL (ref 5–40)
WBC # UR STRIP: ABNORMAL /HPF
WBC NRBC COR # BLD: 5.03 10*3/MM3 (ref 3.4–10.8)

## 2023-04-19 PROCEDURE — G0103 PSA SCREENING: HCPCS | Performed by: FAMILY MEDICINE

## 2023-04-19 PROCEDURE — 83036 HEMOGLOBIN GLYCOSYLATED A1C: CPT | Performed by: FAMILY MEDICINE

## 2023-04-19 PROCEDURE — 85025 COMPLETE CBC W/AUTO DIFF WBC: CPT | Performed by: FAMILY MEDICINE

## 2023-04-19 PROCEDURE — 80061 LIPID PANEL: CPT | Performed by: FAMILY MEDICINE

## 2023-04-19 PROCEDURE — 81001 URINALYSIS AUTO W/SCOPE: CPT | Performed by: FAMILY MEDICINE

## 2023-04-19 PROCEDURE — 36415 COLL VENOUS BLD VENIPUNCTURE: CPT | Performed by: FAMILY MEDICINE

## 2023-04-19 PROCEDURE — 80053 COMPREHEN METABOLIC PANEL: CPT | Performed by: FAMILY MEDICINE

## 2023-04-19 PROCEDURE — 84443 ASSAY THYROID STIM HORMONE: CPT | Performed by: FAMILY MEDICINE

## 2023-04-20 LAB — TSH SERPL DL<=0.005 MIU/L-ACNC: 3.74 UIU/ML (ref 0.45–4.5)

## 2023-04-26 ENCOUNTER — LAB (OUTPATIENT)
Dept: LAB | Facility: HOSPITAL | Age: 53
End: 2023-04-26
Payer: COMMERCIAL

## 2023-04-26 ENCOUNTER — OFFICE VISIT (OUTPATIENT)
Dept: SPORTS MEDICINE | Facility: CLINIC | Age: 53
End: 2023-04-26
Payer: COMMERCIAL

## 2023-04-26 VITALS
HEART RATE: 64 BPM | BODY MASS INDEX: 27.44 KG/M2 | SYSTOLIC BLOOD PRESSURE: 126 MMHG | TEMPERATURE: 97.6 F | OXYGEN SATURATION: 98 % | DIASTOLIC BLOOD PRESSURE: 88 MMHG | WEIGHT: 196 LBS | HEIGHT: 71 IN

## 2023-04-26 DIAGNOSIS — Z00.00 ANNUAL PHYSICAL EXAM: Primary | ICD-10-CM

## 2023-04-26 DIAGNOSIS — R80.0 ISOLATED PROTEINURIA WITHOUT SPECIFIC MORPHOLOGIC LESION: ICD-10-CM

## 2023-04-26 DIAGNOSIS — R31.29 MICROSCOPIC HEMATURIA: ICD-10-CM

## 2023-04-26 LAB
BACTERIA UR QL AUTO: NORMAL /HPF
BILIRUB UR QL STRIP: NEGATIVE
CLARITY UR: CLEAR
COLOR UR: YELLOW
CREAT UR-MCNC: 219.2 MG/DL
GLUCOSE UR STRIP-MCNC: NEGATIVE MG/DL
HGB UR QL STRIP.AUTO: ABNORMAL
HYALINE CASTS UR QL AUTO: NORMAL /LPF
KETONES UR QL STRIP: NEGATIVE
LEUKOCYTE ESTERASE UR QL STRIP.AUTO: NEGATIVE
NITRITE UR QL STRIP: NEGATIVE
PH UR STRIP.AUTO: 5.5 [PH] (ref 5–8)
PROT ?TM UR-MCNC: 16.4 MG/DL
PROT UR QL STRIP: NEGATIVE
PROT/CREAT UR: 74.8 MG/G CREA (ref 0–200)
RBC # UR STRIP: NORMAL /HPF
REF LAB TEST METHOD: NORMAL
SP GR UR STRIP: >=1.03 (ref 1–1.03)
SQUAMOUS #/AREA URNS HPF: NORMAL /HPF
UROBILINOGEN UR QL STRIP: ABNORMAL
WBC # UR STRIP: NORMAL /HPF

## 2023-04-26 PROCEDURE — 81001 URINALYSIS AUTO W/SCOPE: CPT | Performed by: FAMILY MEDICINE

## 2023-04-26 PROCEDURE — 84156 ASSAY OF PROTEIN URINE: CPT | Performed by: FAMILY MEDICINE

## 2023-04-26 PROCEDURE — 87086 URINE CULTURE/COLONY COUNT: CPT | Performed by: FAMILY MEDICINE

## 2023-04-26 PROCEDURE — 82570 ASSAY OF URINE CREATININE: CPT | Performed by: FAMILY MEDICINE

## 2023-04-26 PROCEDURE — 99396 PREV VISIT EST AGE 40-64: CPT | Performed by: FAMILY MEDICINE

## 2023-04-26 NOTE — PROGRESS NOTES
"Alon Rivera is here today for an annual physical exam.     Overall well since last visit.  Frustrated with some difficulty controlling his weight.  Walking 3 to 4 miles several days per week.  Admits to a little bit less diet control this year.  Reflux well controlled    PHQ-2 Depression Screening  Little interest or pleasure in doing things? 0-->not at all   Feeling down, depressed, or hopeless? 0-->not at all   PHQ-2 Total Score 0       Health Maintenance   Topic Date Due   • ZOSTER VACCINE (1 of 2) Never done   • COVID-19 Vaccine (3 - Booster for Moderna series) 01/13/2022   • ANNUAL PHYSICAL  03/08/2023   • INFLUENZA VACCINE  08/01/2023   • TDAP/TD VACCINES (2 - Td or Tdap) 10/29/2023   • LIPID PANEL  04/19/2024   • COLORECTAL CANCER SCREENING  01/11/2029   • HEPATITIS C SCREENING  Completed   • Pneumococcal Vaccine 0-64  Aged Out       Review of Systems    /88 (BP Location: Left arm, Patient Position: Sitting, Cuff Size: Adult)   Pulse 64   Temp 97.6 °F (36.4 °C) (Temporal)   Ht 180.3 cm (70.98\")   Wt 88.9 kg (196 lb)   SpO2 98%   BMI 27.35 kg/m²      Physical Exam    Vital signs reviewed.  General appearance: No acute distress  Eyes: conjunctiva clear without erythema; pupils equally round and reactive  ENT: external ears normal; hearing normal  Neck: supple; no thyromegaly  CV: normal rate and rhythm; no peripheral edema  Respiratory: normal respiratory effort; lungs clear to auscultation bilaterally  MSK: normal gait and station; no focal joint deformity or swelling  Skin: no rash or wounds; normal turgor  Neuro: cranial nerves 2-12 grossly intact; normal sensation to light touch  Psych: mood and affect normal; recent and remote memory intact      Current Outpatient Medications:   •  atorvastatin (LIPITOR) 20 MG tablet, TAKE ONE TABLET BY MOUTH DAILY, Disp: 30 tablet, Rfl: 1  •  esomeprazole (nexIUM) 40 MG capsule, TAKE ONE CAPSULE BY MOUTH EVERY MORNING BEFORE BREAKFAST, Disp: 90 capsule, Rfl: " 3  •  Multiple Vitamins-Minerals (MULTIVITAMIN ADULT PO), Take 1 tablet by mouth Daily., Disp: , Rfl:     Diagnoses and all orders for this visit:    1. Annual physical exam (Primary)    2. Isolated proteinuria without specific morphologic lesion  -     Urinalysis With Microscopic - Urine, Clean Catch  -     Urine Culture - Urine, Urine, Clean Catch  -     Protein / Creatinine Ratio, Urine - Urine, Clean Catch    3. Microscopic hematuria  -     Urinalysis With Microscopic - Urine, Clean Catch  -     Urine Culture - Urine, Urine, Clean Catch  -     Protein / Creatinine Ratio, Urine - Urine, Clean Catch        Encourage healthy diet and exercise.  Encourage patient to stay up to date on screening examinations as indicated based on age and risk factors.  Reviewed labs.  Note that lipids are slightly elevated, he will work on diet and exercise to get that back under control.  Small amount of microscopic hematuria and urine protein, will recheck today.    EMR Dragon/Transcription disclaimer:    Much of this encounter note is an electronic transcription/translation of spoken language to printed text.  The electronic translation of spoken language may permit erroneous, or at times, nonsensical words or phrases to be inadvertently transcribed.  Although I have reviewed the note for such errors some may still exist.

## 2023-04-27 LAB — BACTERIA SPEC AEROBE CULT: NO GROWTH

## 2023-06-01 RX ORDER — ATORVASTATIN CALCIUM 20 MG/1
TABLET, FILM COATED ORAL
Qty: 30 TABLET | Refills: 1 | Status: SHIPPED | OUTPATIENT
Start: 2023-06-01

## 2023-06-08 DIAGNOSIS — K21.9 GASTROESOPHAGEAL REFLUX DISEASE: ICD-10-CM

## 2023-06-09 RX ORDER — ESOMEPRAZOLE MAGNESIUM 40 MG/1
CAPSULE, DELAYED RELEASE ORAL
Qty: 90 CAPSULE | Refills: 3 | Status: SHIPPED | OUTPATIENT
Start: 2023-06-09

## 2023-08-28 RX ORDER — ATORVASTATIN CALCIUM 20 MG/1
TABLET, FILM COATED ORAL
Qty: 30 TABLET | Refills: 1 | Status: SHIPPED | OUTPATIENT
Start: 2023-08-28

## 2023-10-26 RX ORDER — ATORVASTATIN CALCIUM 20 MG/1
TABLET, FILM COATED ORAL
Qty: 30 TABLET | Refills: 1 | Status: SHIPPED | OUTPATIENT
Start: 2023-10-26

## 2023-12-26 RX ORDER — ATORVASTATIN CALCIUM 20 MG/1
TABLET, FILM COATED ORAL
Qty: 30 TABLET | Refills: 1 | Status: SHIPPED | OUTPATIENT
Start: 2023-12-26

## 2024-02-20 RX ORDER — ATORVASTATIN CALCIUM 20 MG/1
TABLET, FILM COATED ORAL
Qty: 30 TABLET | Refills: 1 | Status: SHIPPED | OUTPATIENT
Start: 2024-02-20

## 2024-04-23 ENCOUNTER — TELEPHONE (OUTPATIENT)
Dept: SPORTS MEDICINE | Facility: CLINIC | Age: 54
End: 2024-04-23
Payer: COMMERCIAL

## 2024-04-23 NOTE — TELEPHONE ENCOUNTER
Pharmacy is requesting a prescription for Patient.  atorvastatin (LIPITOR) 20 MG tablet   Jazlyn is requesting a call back at [phone]    Please advise.

## 2024-04-24 RX ORDER — ATORVASTATIN CALCIUM 20 MG/1
20 TABLET, FILM COATED ORAL DAILY
Qty: 30 TABLET | Refills: 0 | Status: SHIPPED | OUTPATIENT
Start: 2024-04-24 | End: 2024-04-29 | Stop reason: SDUPTHER

## 2024-04-29 ENCOUNTER — OFFICE VISIT (OUTPATIENT)
Dept: SPORTS MEDICINE | Facility: CLINIC | Age: 54
End: 2024-04-29
Payer: COMMERCIAL

## 2024-04-29 ENCOUNTER — LAB (OUTPATIENT)
Dept: LAB | Facility: HOSPITAL | Age: 54
End: 2024-04-29
Payer: COMMERCIAL

## 2024-04-29 VITALS
WEIGHT: 200.2 LBS | BODY MASS INDEX: 28.03 KG/M2 | DIASTOLIC BLOOD PRESSURE: 100 MMHG | TEMPERATURE: 97.6 F | OXYGEN SATURATION: 100 % | SYSTOLIC BLOOD PRESSURE: 142 MMHG | HEART RATE: 73 BPM | HEIGHT: 71 IN

## 2024-04-29 DIAGNOSIS — Z12.5 SCREENING FOR PROSTATE CANCER: ICD-10-CM

## 2024-04-29 DIAGNOSIS — E78.2 MIXED HYPERLIPIDEMIA: ICD-10-CM

## 2024-04-29 DIAGNOSIS — K21.9 GASTROESOPHAGEAL REFLUX DISEASE: ICD-10-CM

## 2024-04-29 DIAGNOSIS — I10 PRIMARY HYPERTENSION: ICD-10-CM

## 2024-04-29 DIAGNOSIS — Z00.00 ANNUAL PHYSICAL EXAM: Primary | ICD-10-CM

## 2024-04-29 LAB
ALBUMIN SERPL-MCNC: 4.4 G/DL (ref 3.5–5.2)
ALBUMIN/GLOB SERPL: 1.6 G/DL
ALP SERPL-CCNC: 63 U/L (ref 39–117)
ALT SERPL W P-5'-P-CCNC: 55 U/L (ref 1–41)
ANION GAP SERPL CALCULATED.3IONS-SCNC: 9.8 MMOL/L (ref 5–15)
AST SERPL-CCNC: 37 U/L (ref 1–40)
BACTERIA UR QL AUTO: NORMAL /HPF
BASOPHILS # BLD AUTO: 0.03 10*3/MM3 (ref 0–0.2)
BASOPHILS NFR BLD AUTO: 0.6 % (ref 0–1.5)
BILIRUB SERPL-MCNC: 0.6 MG/DL (ref 0–1.2)
BILIRUB UR QL STRIP: NEGATIVE
BUN SERPL-MCNC: 15 MG/DL (ref 6–20)
BUN/CREAT SERPL: 16 (ref 7–25)
CALCIUM SPEC-SCNC: 9 MG/DL (ref 8.6–10.5)
CHLORIDE SERPL-SCNC: 104 MMOL/L (ref 98–107)
CHOLEST SERPL-MCNC: 200 MG/DL (ref 0–200)
CLARITY UR: CLEAR
CO2 SERPL-SCNC: 26.2 MMOL/L (ref 22–29)
COLOR UR: YELLOW
CREAT SERPL-MCNC: 0.94 MG/DL (ref 0.76–1.27)
DEPRECATED RDW RBC AUTO: 44.6 FL (ref 37–54)
EGFRCR SERPLBLD CKD-EPI 2021: 97.5 ML/MIN/1.73
EOSINOPHIL # BLD AUTO: 0.12 10*3/MM3 (ref 0–0.4)
EOSINOPHIL NFR BLD AUTO: 2.6 % (ref 0.3–6.2)
ERYTHROCYTE [DISTWIDTH] IN BLOOD BY AUTOMATED COUNT: 13.3 % (ref 12.3–15.4)
GLOBULIN UR ELPH-MCNC: 2.7 GM/DL
GLUCOSE SERPL-MCNC: 95 MG/DL (ref 65–99)
GLUCOSE UR STRIP-MCNC: NEGATIVE MG/DL
HBA1C MFR BLD: 5.2 % (ref 4.8–5.6)
HCT VFR BLD AUTO: 43.1 % (ref 37.5–51)
HDLC SERPL QL: 4.08
HDLC SERPL-MCNC: 49 MG/DL (ref 40–60)
HGB BLD-MCNC: 14.6 G/DL (ref 13–17.7)
HGB UR QL STRIP.AUTO: ABNORMAL
HOLD SPECIMEN: NORMAL
HYALINE CASTS UR QL AUTO: NORMAL /LPF
IMM GRANULOCYTES # BLD AUTO: 0.01 10*3/MM3 (ref 0–0.05)
IMM GRANULOCYTES NFR BLD AUTO: 0.2 % (ref 0–0.5)
KETONES UR QL STRIP: NEGATIVE
LDLC SERPL CALC-MCNC: 119 MG/DL (ref 0–100)
LEUKOCYTE ESTERASE UR QL STRIP.AUTO: NEGATIVE
LYMPHOCYTES # BLD AUTO: 1.57 10*3/MM3 (ref 0.7–3.1)
LYMPHOCYTES NFR BLD AUTO: 33.9 % (ref 19.6–45.3)
MCH RBC QN AUTO: 30.7 PG (ref 26.6–33)
MCHC RBC AUTO-ENTMCNC: 33.9 G/DL (ref 31.5–35.7)
MCV RBC AUTO: 90.7 FL (ref 79–97)
MONOCYTES # BLD AUTO: 0.45 10*3/MM3 (ref 0.1–0.9)
MONOCYTES NFR BLD AUTO: 9.7 % (ref 5–12)
NEUTROPHILS NFR BLD AUTO: 2.45 10*3/MM3 (ref 1.7–7)
NEUTROPHILS NFR BLD AUTO: 53 % (ref 42.7–76)
NITRITE UR QL STRIP: NEGATIVE
NRBC BLD AUTO-RTO: 0 /100 WBC (ref 0–0.2)
PH UR STRIP.AUTO: 5.5 [PH] (ref 5–8)
PLATELET # BLD AUTO: 283 10*3/MM3 (ref 140–450)
PMV BLD AUTO: 10.5 FL (ref 6–12)
POTASSIUM SERPL-SCNC: 4.2 MMOL/L (ref 3.5–5.2)
PROT SERPL-MCNC: 7.1 G/DL (ref 6–8.5)
PROT UR QL STRIP: NEGATIVE
PSA SERPL-MCNC: 0.41 NG/ML (ref 0–4)
RBC # BLD AUTO: 4.75 10*6/MM3 (ref 4.14–5.8)
RBC # UR STRIP: NORMAL /HPF
REF LAB TEST METHOD: NORMAL
SODIUM SERPL-SCNC: 140 MMOL/L (ref 136–145)
SP GR UR STRIP: 1.02 (ref 1–1.03)
SQUAMOUS #/AREA URNS HPF: NORMAL /HPF
TRIGL SERPL-MCNC: 182 MG/DL (ref 0–150)
UROBILINOGEN UR QL STRIP: ABNORMAL
VLDLC SERPL-MCNC: 32 MG/DL (ref 5–40)
WBC # UR STRIP: NORMAL /HPF
WBC NRBC COR # BLD AUTO: 4.63 10*3/MM3 (ref 3.4–10.8)

## 2024-04-29 PROCEDURE — 99396 PREV VISIT EST AGE 40-64: CPT | Performed by: FAMILY MEDICINE

## 2024-04-29 PROCEDURE — 81001 URINALYSIS AUTO W/SCOPE: CPT | Performed by: FAMILY MEDICINE

## 2024-04-29 PROCEDURE — 36415 COLL VENOUS BLD VENIPUNCTURE: CPT | Performed by: FAMILY MEDICINE

## 2024-04-29 PROCEDURE — 83036 HEMOGLOBIN GLYCOSYLATED A1C: CPT | Performed by: FAMILY MEDICINE

## 2024-04-29 PROCEDURE — 84403 ASSAY OF TOTAL TESTOSTERONE: CPT | Performed by: FAMILY MEDICINE

## 2024-04-29 PROCEDURE — 84402 ASSAY OF FREE TESTOSTERONE: CPT | Performed by: FAMILY MEDICINE

## 2024-04-29 PROCEDURE — G0103 PSA SCREENING: HCPCS | Performed by: FAMILY MEDICINE

## 2024-04-29 PROCEDURE — 80050 GENERAL HEALTH PANEL: CPT | Performed by: FAMILY MEDICINE

## 2024-04-29 PROCEDURE — 80061 LIPID PANEL: CPT | Performed by: FAMILY MEDICINE

## 2024-04-29 RX ORDER — ATORVASTATIN CALCIUM 20 MG/1
20 TABLET, FILM COATED ORAL DAILY
Qty: 90 TABLET | Refills: 3 | Status: SHIPPED | OUTPATIENT
Start: 2024-04-29

## 2024-04-29 RX ORDER — RAMIPRIL 5 MG/1
5 CAPSULE ORAL DAILY
Qty: 90 CAPSULE | Refills: 1 | Status: SHIPPED | OUTPATIENT
Start: 2024-04-29

## 2024-04-29 RX ORDER — ESOMEPRAZOLE MAGNESIUM 40 MG/1
40 CAPSULE, DELAYED RELEASE ORAL
Qty: 90 CAPSULE | Refills: 3 | Status: SHIPPED | OUTPATIENT
Start: 2024-04-29

## 2024-04-29 NOTE — PROGRESS NOTES
"Alon Rivera is here today for an annual physical exam.     Diet: Well Balanced Diet  Exercise: walking  Recently noted high BP at dental visit. Home checks have remained high. Feeling fine, walking for exercise and golf. Off all nicotine now.     PHQ-2 Depression Screening  Little interest or pleasure in doing things? 0-->not at all   Feeling down, depressed, or hopeless? 0-->not at all   PHQ-2 Total Score 0       Health Maintenance   Topic Date Due    BMI FOLLOWUP  Never done    ZOSTER VACCINE (1 of 2) Never done    COVID-19 Vaccine (3 - 2023-24 season) 09/01/2023    TDAP/TD VACCINES (2 - Td or Tdap) 10/29/2023    LIPID PANEL  04/19/2024    ANNUAL PHYSICAL  04/26/2024    INFLUENZA VACCINE  08/01/2024    COLORECTAL CANCER SCREENING  01/11/2029    HEPATITIS C SCREENING  Completed    Pneumococcal Vaccine 0-64  Aged Out       Review of Systems    /100   Pulse 73   Temp 97.6 °F (36.4 °C) (Temporal)   Ht 180.3 cm (70.98\")   Wt 90.8 kg (200 lb 3.2 oz)   SpO2 100%   BMI 27.94 kg/m²      BMI is >= 25 and <30. (Overweight) The following options were offered after discussion;: exercise counseling/recommendations and nutrition counseling/recommendations      Physical Exam    Vital signs reviewed.  General appearance: No acute distress  Eyes: conjunctiva clear without erythema; pupils equally round and reactive  ENT: external ears normal; hearing normal  Neck: supple; no thyromegaly  CV: normal rate and rhythm; no peripheral edema  Respiratory: normal respiratory effort; lungs clear to auscultation bilaterally  MSK: normal gait and station; no focal joint deformity or swelling  Skin: no rash or wounds; normal turgor  Neuro: cranial nerves 2-12 grossly intact; normal sensation to light touch  Psych: mood and affect normal; recent and remote memory intact      Current Outpatient Medications:     atorvastatin (LIPITOR) 20 MG tablet, Take 1 tablet by mouth Daily., Disp: 90 tablet, Rfl: 3    esomeprazole (nexIUM) 40 MG " capsule, Take 1 capsule by mouth Every Morning Before Breakfast., Disp: 90 capsule, Rfl: 3    Multiple Vitamins-Minerals (MULTIVITAMIN ADULT PO), Take 1 tablet by mouth Daily., Disp: , Rfl:     ramipril (Altace) 5 MG capsule, Take 1 capsule by mouth Daily., Disp: 90 capsule, Rfl: 1    Diagnoses and all orders for this visit:    1. Annual physical exam (Primary)  -     CBC & Differential  -     Comprehensive Metabolic Panel  -     Lipid Panel With / Chol / HDL Ratio  -     Thyroid Cascade Profile  -     Urinalysis With Culture If Indicated -  -     Hemoglobin A1c  -     PSA Screen  -     Testosterone, Free, Total    2. Mixed hyperlipidemia  -     atorvastatin (LIPITOR) 20 MG tablet; Take 1 tablet by mouth Daily.  Dispense: 90 tablet; Refill: 3    3. Primary hypertension  -     ramipril (Altace) 5 MG capsule; Take 1 capsule by mouth Daily.  Dispense: 90 capsule; Refill: 1    4. Gastroesophageal reflux disease  -     esomeprazole (nexIUM) 40 MG capsule; Take 1 capsule by mouth Every Morning Before Breakfast.  Dispense: 90 capsule; Refill: 3    5. Screening for prostate cancer  -     PSA Screen        Encourage healthy diet and exercise.  Encourage patient to stay up to date on screening examinations as indicated based on age and risk factors.  Restart ramipril for BP, recheck in 1 month. Noted weight trend - was down at 178 when he stopped it in 2019. Check testo for trouble losing weight.     EMR Dragon/Transcription disclaimer:    Much of this encounter note is an electronic transcription/translation of spoken language to printed text.  The electronic translation of spoken language may permit erroneous, or at times, nonsensical words or phrases to be inadvertently transcribed.  Although I have reviewed the note for such errors some may still exist.

## 2024-04-30 LAB — TSH SERPL DL<=0.005 MIU/L-ACNC: 2.32 UIU/ML (ref 0.45–4.5)

## 2024-05-03 LAB
TESTOST FREE SERPL-MCNC: 5.5 PG/ML (ref 7.2–24)
TESTOST SERPL-MCNC: 385 NG/DL (ref 264–916)

## 2024-05-13 DIAGNOSIS — R79.89 LOW TESTOSTERONE IN MALE: Primary | ICD-10-CM

## 2024-05-13 DIAGNOSIS — R79.89 LFT ELEVATION: Primary | ICD-10-CM

## 2024-05-24 ENCOUNTER — LAB (OUTPATIENT)
Dept: LAB | Facility: HOSPITAL | Age: 54
End: 2024-05-24
Payer: COMMERCIAL

## 2024-05-24 ENCOUNTER — HOSPITAL ENCOUNTER (OUTPATIENT)
Dept: ULTRASOUND IMAGING | Facility: HOSPITAL | Age: 54
Discharge: HOME | End: 2024-05-24
Payer: COMMERCIAL

## 2024-05-24 DIAGNOSIS — R79.89 LFT ELEVATION: ICD-10-CM

## 2024-05-24 PROCEDURE — 84403 ASSAY OF TOTAL TESTOSTERONE: CPT | Performed by: FAMILY MEDICINE

## 2024-05-24 PROCEDURE — 76705 ECHO EXAM OF ABDOMEN: CPT

## 2024-05-24 PROCEDURE — 84402 ASSAY OF FREE TESTOSTERONE: CPT | Performed by: FAMILY MEDICINE

## 2024-05-30 ENCOUNTER — OFFICE VISIT (OUTPATIENT)
Dept: SPORTS MEDICINE | Facility: CLINIC | Age: 54
End: 2024-05-30
Payer: COMMERCIAL

## 2024-05-30 VITALS
HEIGHT: 71 IN | SYSTOLIC BLOOD PRESSURE: 158 MMHG | HEART RATE: 75 BPM | OXYGEN SATURATION: 97 % | WEIGHT: 200 LBS | TEMPERATURE: 97.2 F | BODY MASS INDEX: 28 KG/M2 | DIASTOLIC BLOOD PRESSURE: 100 MMHG

## 2024-05-30 DIAGNOSIS — K76.0 FATTY LIVER: ICD-10-CM

## 2024-05-30 DIAGNOSIS — I10 PRIMARY HYPERTENSION: Primary | ICD-10-CM

## 2024-05-30 PROCEDURE — 99214 OFFICE O/P EST MOD 30 MIN: CPT | Performed by: FAMILY MEDICINE

## 2024-05-30 NOTE — PROGRESS NOTES
"Alon is a 53 y.o. year old male    Chief Complaint   Patient presents with    Hypertension     Patient is here to follow up on his blood pressure.     Results     Patient had US of his gallbladder done on 05/24.        History of Present Illness   Hypertension       Follow-up hypertension.  Since his last visit he had no side effects with ramipril but notes his blood pressure remains variable, only controlled about half the time.  No symptoms.  Has been trying to increase his exercise.    Follow-up abnormal liver function enzymes recent gallbladder ultrasound    Review of Systems    /100 (BP Location: Right arm, Patient Position: Sitting, Cuff Size: Adult)   Pulse 75   Temp 97.2 °F (36.2 °C) (Temporal)   Ht 180.3 cm (70.98\")   Wt 90.7 kg (200 lb)   SpO2 97%   BMI 27.91 kg/m²          Physical Exam  Vitals and nursing note reviewed.   Constitutional:       General: He is not in acute distress.     Appearance: He is well-developed.   HENT:      Head: Normocephalic and atraumatic.   Eyes:      Conjunctiva/sclera: Conjunctivae normal.      Pupils: Pupils are equal, round, and reactive to light.   Cardiovascular:      Rate and Rhythm: Normal rate and regular rhythm.      Heart sounds: Normal heart sounds.   Pulmonary:      Effort: Pulmonary effort is normal.      Breath sounds: Normal breath sounds.   Musculoskeletal:      Cervical back: Normal range of motion.   Skin:     General: Skin is warm and dry.   Neurological:      General: No focal deficit present.      Mental Status: He is alert and oriented to person, place, and time.   Psychiatric:         Mood and Affect: Mood normal.         Behavior: Behavior normal.         Thought Content: Thought content normal.         Judgment: Judgment normal.           Current Outpatient Medications:     atorvastatin (LIPITOR) 20 MG tablet, Take 1 tablet by mouth Daily., Disp: 90 tablet, Rfl: 3    esomeprazole (nexIUM) 40 MG capsule, Take 1 capsule by mouth Every " Morning Before Breakfast., Disp: 90 capsule, Rfl: 3    Multiple Vitamins-Minerals (MULTIVITAMIN ADULT PO), Take 1 tablet by mouth Daily., Disp: , Rfl:     ramipril (Altace) 5 MG capsule, Take 1 capsule by mouth Daily., Disp: 90 capsule, Rfl: 1     Diagnoses and all orders for this visit:    Primary hypertension    Fatty liver    Increase to 10 mg ramipril using his home supply and update me in 2 to 3 weeks on his progress.  If this does not solve his lability will consider further workup or alternative medication plan.    We reviewed his, and ultrasound which shows fatty infiltration of the liver.  Discussed diet and exercise to manage this.    Repeat testosterone level still pending we will follow-up on that when it comes back in a few days.        EMR Dragon/Transcription disclaimer:    Much of this encounter note is an electronic transcription/translation of spoken language to printed text.  The electronic translation of spoken language may permit erroneous, or at times, nonsensical words or phrases to be inadvertently transcribed.  Although I have reviewed the note for such errors some may still exist.

## 2024-05-31 LAB
TESTOST FREE SERPL-MCNC: 6.1 PG/ML (ref 7.2–24)
TESTOST SERPL-MCNC: 441 NG/DL (ref 264–916)

## 2024-06-26 ENCOUNTER — PATIENT MESSAGE (OUTPATIENT)
Dept: SPORTS MEDICINE | Facility: CLINIC | Age: 54
End: 2024-06-26
Payer: COMMERCIAL

## 2024-06-26 DIAGNOSIS — I10 PRIMARY HYPERTENSION: ICD-10-CM

## 2024-06-27 RX ORDER — RAMIPRIL 10 MG/1
10 CAPSULE ORAL DAILY
Qty: 90 CAPSULE | Refills: 1 | Status: SHIPPED | OUTPATIENT
Start: 2024-06-27

## 2024-06-27 NOTE — TELEPHONE ENCOUNTER
Caroline Briscoe MA 6/27/2024 10:59 AM EDT    Please advise if there is a change is dosage?  ----- Message -----  From: Alon Rivera  Sent: 6/26/2024 1:56 PM EDT  To: [facility] Clinical Pool  Subject: Ramps refill     Hello - Been taking double as suggested. Blood pressure still seems to be running high. Have appointment next Tuesday but need a refill and Clarisa says it is too soon. Could you send a new prescription in? Thank you

## 2024-07-02 ENCOUNTER — OFFICE VISIT (OUTPATIENT)
Dept: SPORTS MEDICINE | Facility: CLINIC | Age: 54
End: 2024-07-02
Payer: COMMERCIAL

## 2024-07-02 VITALS
DIASTOLIC BLOOD PRESSURE: 86 MMHG | WEIGHT: 190 LBS | BODY MASS INDEX: 26.6 KG/M2 | HEIGHT: 71 IN | TEMPERATURE: 98 F | RESPIRATION RATE: 18 BRPM | SYSTOLIC BLOOD PRESSURE: 130 MMHG

## 2024-07-02 DIAGNOSIS — I10 PRIMARY HYPERTENSION: Primary | ICD-10-CM

## 2024-07-02 PROCEDURE — 99214 OFFICE O/P EST MOD 30 MIN: CPT | Performed by: FAMILY MEDICINE

## 2024-07-02 RX ORDER — HYDROCHLOROTHIAZIDE 25 MG/1
25 TABLET ORAL DAILY
Qty: 30 TABLET | Refills: 2 | Status: SHIPPED | OUTPATIENT
Start: 2024-07-02

## 2024-07-02 NOTE — PROGRESS NOTES
"Alon is a 53 y.o. year old male    Chief Complaint   Patient presents with    Hypertension     Checking BP and following up on medication          History of Present Illness   HPI   Here to follow-up on low testosterone and hypertension.  Since his last visit he continues ramipril at 10 mg daily but his home blood pressure checks remain elevated in the 150 systolic range.    Review of Systems    /86   Temp 98 °F (36.7 °C)   Resp 18   Ht 180.3 cm (71\")   Wt 86.2 kg (190 lb)   BMI 26.50 kg/m²          Physical Exam  Vitals and nursing note reviewed.   Constitutional:       General: He is not in acute distress.     Appearance: He is well-developed.   HENT:      Head: Normocephalic and atraumatic.   Eyes:      Conjunctiva/sclera: Conjunctivae normal.      Pupils: Pupils are equal, round, and reactive to light.   Cardiovascular:      Rate and Rhythm: Normal rate and regular rhythm.      Heart sounds: Normal heart sounds.   Pulmonary:      Effort: Pulmonary effort is normal.      Breath sounds: Normal breath sounds.   Musculoskeletal:      Cervical back: Normal range of motion.   Skin:     General: Skin is warm and dry.   Neurological:      General: No focal deficit present.      Mental Status: He is alert and oriented to person, place, and time.   Psychiatric:         Mood and Affect: Mood normal.         Behavior: Behavior normal.         Thought Content: Thought content normal.         Judgment: Judgment normal.           Current Outpatient Medications:     atorvastatin (LIPITOR) 20 MG tablet, Take 1 tablet by mouth Daily., Disp: 90 tablet, Rfl: 3    esomeprazole (nexIUM) 40 MG capsule, Take 1 capsule by mouth Every Morning Before Breakfast., Disp: 90 capsule, Rfl: 3    Multiple Vitamins-Minerals (MULTIVITAMIN ADULT PO), Take 1 tablet by mouth Daily., Disp: , Rfl:     ramipril (Altace) 10 MG capsule, Take 1 capsule by mouth Daily., Disp: 90 capsule, Rfl: 1    hydroCHLOROthiazide 25 MG tablet, Take 1 tablet " by mouth Daily., Disp: 30 tablet, Rfl: 2     Diagnoses and all orders for this visit:    Primary hypertension  -     hydroCHLOROthiazide 25 MG tablet; Take 1 tablet by mouth Daily.       Optimize blood pressure control with addition of hydrochlorothiazide.  Continue lifestyle measures.  Once his blood pressure remains stable we can come back and consider treatment for his low testosterone.      EMR Dragon/Transcription disclaimer:    Much of this encounter note is an electronic transcription/translation of spoken language to printed text.  The electronic translation of spoken language may permit erroneous, or at times, nonsensical words or phrases to be inadvertently transcribed.  Although I have reviewed the note for such errors some may still exist.

## 2024-09-27 DIAGNOSIS — I10 PRIMARY HYPERTENSION: ICD-10-CM

## 2024-09-27 RX ORDER — HYDROCHLOROTHIAZIDE 25 MG/1
25 TABLET ORAL DAILY
Qty: 30 TABLET | Refills: 2 | Status: SHIPPED | OUTPATIENT
Start: 2024-09-27

## 2024-12-22 DIAGNOSIS — I10 PRIMARY HYPERTENSION: ICD-10-CM

## 2024-12-26 RX ORDER — RAMIPRIL 10 MG/1
10 CAPSULE ORAL DAILY
Qty: 90 CAPSULE | Refills: 1 | Status: SHIPPED | OUTPATIENT
Start: 2024-12-26

## 2024-12-27 DIAGNOSIS — I10 PRIMARY HYPERTENSION: ICD-10-CM

## 2024-12-27 RX ORDER — HYDROCHLOROTHIAZIDE 25 MG/1
25 TABLET ORAL DAILY
Qty: 30 TABLET | Refills: 2 | Status: SHIPPED | OUTPATIENT
Start: 2024-12-27

## 2025-04-01 DIAGNOSIS — I10 PRIMARY HYPERTENSION: ICD-10-CM

## 2025-04-01 RX ORDER — HYDROCHLOROTHIAZIDE 25 MG/1
25 TABLET ORAL DAILY
Qty: 30 TABLET | Refills: 2 | Status: SHIPPED | OUTPATIENT
Start: 2025-04-01

## 2025-04-01 NOTE — TELEPHONE ENCOUNTER
Patient states his pharmacy has called in for a refill on his hydroCHLOROthiazide 25 MG.  He has called in and made a physical appointment.  Please advise.

## 2025-05-13 DIAGNOSIS — E78.2 MIXED HYPERLIPIDEMIA: ICD-10-CM

## 2025-05-13 DIAGNOSIS — K21.9 GASTROESOPHAGEAL REFLUX DISEASE: ICD-10-CM

## 2025-05-13 RX ORDER — ATORVASTATIN CALCIUM 20 MG/1
20 TABLET, FILM COATED ORAL DAILY
Qty: 90 TABLET | Refills: 0 | Status: SHIPPED | OUTPATIENT
Start: 2025-05-13

## 2025-05-13 RX ORDER — ESOMEPRAZOLE MAGNESIUM 40 MG/1
40 CAPSULE, DELAYED RELEASE ORAL
Qty: 90 CAPSULE | Refills: 0 | Status: SHIPPED | OUTPATIENT
Start: 2025-05-13

## 2025-05-20 ENCOUNTER — TELEPHONE (OUTPATIENT)
Dept: SPORTS MEDICINE | Facility: CLINIC | Age: 55
End: 2025-05-20
Payer: COMMERCIAL

## 2025-05-20 DIAGNOSIS — Z00.00 ANNUAL PHYSICAL EXAM: Primary | ICD-10-CM

## 2025-05-20 DIAGNOSIS — I10 PRIMARY HYPERTENSION: ICD-10-CM

## 2025-05-20 DIAGNOSIS — R79.89 LOW TESTOSTERONE IN MALE: ICD-10-CM

## 2025-05-20 DIAGNOSIS — E78.2 MIXED HYPERLIPIDEMIA: ICD-10-CM

## 2025-05-20 DIAGNOSIS — Z13.29 THYROID DISORDER SCREEN: ICD-10-CM

## 2025-05-20 DIAGNOSIS — Z12.5 SCREENING FOR PROSTATE CANCER: ICD-10-CM

## 2025-05-20 NOTE — TELEPHONE ENCOUNTER
Contacted patient to reschedule his CPE, he requested lab order be sent in now so he can do labs before appointment.

## 2025-05-27 ENCOUNTER — TELEPHONE (OUTPATIENT)
Dept: SPORTS MEDICINE | Facility: CLINIC | Age: 55
End: 2025-05-27
Payer: COMMERCIAL

## 2025-05-27 NOTE — TELEPHONE ENCOUNTER
Hub staff attempted to follow warm transfer process and was unsuccessful     Caller: PATIENT     Relationship to patient: SELF     Best call back number: [phone]    Patient is needing: WOULD LIKE TO RESCHEDULE APPT FOR ANNUAL PHYSICAL ON 6-16-25

## 2025-06-23 DIAGNOSIS — I10 PRIMARY HYPERTENSION: ICD-10-CM

## 2025-06-23 RX ORDER — RAMIPRIL 10 MG/1
10 CAPSULE ORAL DAILY
Qty: 90 CAPSULE | Refills: 0 | Status: SHIPPED | OUTPATIENT
Start: 2025-06-23

## 2025-06-30 DIAGNOSIS — I10 PRIMARY HYPERTENSION: ICD-10-CM

## 2025-06-30 RX ORDER — HYDROCHLOROTHIAZIDE 25 MG/1
25 TABLET ORAL DAILY
Qty: 30 TABLET | Refills: 2 | Status: SHIPPED | OUTPATIENT
Start: 2025-06-30

## 2025-07-02 ENCOUNTER — LAB (OUTPATIENT)
Dept: LAB | Facility: HOSPITAL | Age: 55
End: 2025-07-02
Payer: COMMERCIAL

## 2025-07-02 LAB
ALBUMIN SERPL-MCNC: 4.7 G/DL (ref 3.5–5.2)
ALBUMIN/GLOB SERPL: 1.6 G/DL
ALP SERPL-CCNC: 44 U/L (ref 39–117)
ALT SERPL W P-5'-P-CCNC: 28 U/L (ref 1–41)
ANION GAP SERPL CALCULATED.3IONS-SCNC: 8 MMOL/L (ref 5–15)
AST SERPL-CCNC: 28 U/L (ref 1–40)
BASOPHILS # BLD AUTO: 0.04 10*3/MM3 (ref 0–0.2)
BASOPHILS NFR BLD AUTO: 0.8 % (ref 0–1.5)
BILIRUB SERPL-MCNC: 0.7 MG/DL (ref 0–1.2)
BILIRUB UR QL STRIP: NEGATIVE
BUN SERPL-MCNC: 23.2 MG/DL (ref 6–20)
BUN/CREAT SERPL: 20 (ref 7–25)
CALCIUM SPEC-SCNC: 9.7 MG/DL (ref 8.6–10.5)
CHLORIDE SERPL-SCNC: 102 MMOL/L (ref 98–107)
CHOLEST SERPL-MCNC: 174 MG/DL (ref 0–200)
CLARITY UR: CLEAR
CO2 SERPL-SCNC: 31 MMOL/L (ref 22–29)
COLOR UR: YELLOW
CREAT SERPL-MCNC: 1.16 MG/DL (ref 0.76–1.27)
DEPRECATED RDW RBC AUTO: 42.2 FL (ref 37–54)
EGFRCR SERPLBLD CKD-EPI 2021: 75.3 ML/MIN/1.73
EOSINOPHIL # BLD AUTO: 0.07 10*3/MM3 (ref 0–0.4)
EOSINOPHIL NFR BLD AUTO: 1.4 % (ref 0.3–6.2)
ERYTHROCYTE [DISTWIDTH] IN BLOOD BY AUTOMATED COUNT: 12.6 % (ref 12.3–15.4)
GLOBULIN UR ELPH-MCNC: 3 GM/DL
GLUCOSE SERPL-MCNC: 107 MG/DL (ref 65–99)
GLUCOSE UR STRIP-MCNC: NEGATIVE MG/DL
HBA1C MFR BLD: 5.5 % (ref 4.8–5.6)
HCT VFR BLD AUTO: 41.3 % (ref 37.5–51)
HDLC SERPL-MCNC: 33 MG/DL (ref 40–60)
HGB BLD-MCNC: 13.8 G/DL (ref 13–17.7)
HGB UR QL STRIP.AUTO: NEGATIVE
IMM GRANULOCYTES # BLD AUTO: 0.01 10*3/MM3 (ref 0–0.05)
IMM GRANULOCYTES NFR BLD AUTO: 0.2 % (ref 0–0.5)
KETONES UR QL STRIP: NEGATIVE
LDLC SERPL CALC-MCNC: 114 MG/DL (ref 0–100)
LDLC/HDLC SERPL: 3.36 {RATIO}
LEUKOCYTE ESTERASE UR QL STRIP.AUTO: NEGATIVE
LYMPHOCYTES # BLD AUTO: 1.77 10*3/MM3 (ref 0.7–3.1)
LYMPHOCYTES NFR BLD AUTO: 35.8 % (ref 19.6–45.3)
MCH RBC QN AUTO: 30.7 PG (ref 26.6–33)
MCHC RBC AUTO-ENTMCNC: 33.4 G/DL (ref 31.5–35.7)
MCV RBC AUTO: 91.8 FL (ref 79–97)
MONOCYTES # BLD AUTO: 0.49 10*3/MM3 (ref 0.1–0.9)
MONOCYTES NFR BLD AUTO: 9.9 % (ref 5–12)
NEUTROPHILS NFR BLD AUTO: 2.56 10*3/MM3 (ref 1.7–7)
NEUTROPHILS NFR BLD AUTO: 51.9 % (ref 42.7–76)
NITRITE UR QL STRIP: NEGATIVE
NRBC BLD AUTO-RTO: 0 /100 WBC (ref 0–0.2)
PH UR STRIP.AUTO: 7.5 [PH] (ref 5–8)
PLATELET # BLD AUTO: 265 10*3/MM3 (ref 140–450)
PMV BLD AUTO: 10.7 FL (ref 6–12)
POTASSIUM SERPL-SCNC: 4.4 MMOL/L (ref 3.5–5.2)
PROT SERPL-MCNC: 7.7 G/DL (ref 6–8.5)
PROT UR QL STRIP: NEGATIVE
PSA SERPL-MCNC: 0.41 NG/ML (ref 0–4)
RBC # BLD AUTO: 4.5 10*6/MM3 (ref 4.14–5.8)
SODIUM SERPL-SCNC: 141 MMOL/L (ref 136–145)
SP GR UR STRIP: 1.02 (ref 1–1.03)
TRIGL SERPL-MCNC: 150 MG/DL (ref 0–150)
UROBILINOGEN UR QL STRIP: NORMAL
VLDLC SERPL-MCNC: 27 MG/DL (ref 5–40)
WBC NRBC COR # BLD AUTO: 4.94 10*3/MM3 (ref 3.4–10.8)

## 2025-07-02 PROCEDURE — 84403 ASSAY OF TOTAL TESTOSTERONE: CPT | Performed by: FAMILY MEDICINE

## 2025-07-02 PROCEDURE — 80061 LIPID PANEL: CPT | Performed by: FAMILY MEDICINE

## 2025-07-02 PROCEDURE — 80050 GENERAL HEALTH PANEL: CPT | Performed by: FAMILY MEDICINE

## 2025-07-02 PROCEDURE — 84402 ASSAY OF FREE TESTOSTERONE: CPT | Performed by: FAMILY MEDICINE

## 2025-07-02 PROCEDURE — 83036 HEMOGLOBIN GLYCOSYLATED A1C: CPT | Performed by: FAMILY MEDICINE

## 2025-07-02 PROCEDURE — G0103 PSA SCREENING: HCPCS | Performed by: FAMILY MEDICINE

## 2025-07-03 LAB — TSH SERPL DL<=0.005 MIU/L-ACNC: 3.09 UIU/ML (ref 0.45–4.5)

## 2025-07-04 LAB
TESTOST FREE SERPL-MCNC: 4.8 PG/ML (ref 7.2–24)
TESTOST SERPL-MCNC: 218 NG/DL (ref 264–916)

## 2025-07-07 ENCOUNTER — OFFICE VISIT (OUTPATIENT)
Dept: SPORTS MEDICINE | Facility: CLINIC | Age: 55
End: 2025-07-07
Payer: COMMERCIAL

## 2025-07-07 VITALS
SYSTOLIC BLOOD PRESSURE: 124 MMHG | HEIGHT: 71 IN | WEIGHT: 189 LBS | BODY MASS INDEX: 26.46 KG/M2 | DIASTOLIC BLOOD PRESSURE: 76 MMHG | OXYGEN SATURATION: 99 % | HEART RATE: 86 BPM | RESPIRATION RATE: 16 BRPM

## 2025-07-07 DIAGNOSIS — Z00.00 ANNUAL PHYSICAL EXAM: Primary | ICD-10-CM

## 2025-07-07 DIAGNOSIS — R79.89 LOW TESTOSTERONE IN MALE: ICD-10-CM

## 2025-07-07 PROCEDURE — 99396 PREV VISIT EST AGE 40-64: CPT | Performed by: FAMILY MEDICINE

## 2025-07-07 RX ORDER — TESTOSTERONE CYPIONATE 200 MG/ML
100 INJECTION, SOLUTION INTRAMUSCULAR WEEKLY
Qty: 6 ML | Refills: 0 | Status: SHIPPED | OUTPATIENT
Start: 2025-07-07

## 2025-07-07 NOTE — PROGRESS NOTES
"Alon Rivera is here today for an annual physical exam.     Diet: Well Balanced Diet  Exercise: HIIT    The PHQ has not been completed during this encounter.         Health Maintenance   Topic Date Due    Pneumococcal Vaccine 50+ (1 of 1 - PCV) Never done    ZOSTER VACCINE (1 of 2) Never done    TDAP/TD VACCINES (2 - Td or Tdap) 10/29/2023    COVID-19 Vaccine (3 - 2024-25 season) 09/01/2024    ANNUAL PHYSICAL  04/29/2025    INFLUENZA VACCINE  10/01/2025    LIPID PANEL  07/02/2026    COLORECTAL CANCER SCREENING  01/11/2029    HEPATITIS C SCREENING  Completed       Review of Systems    /76   Pulse 86   Resp 16   Ht 180.3 cm (70.98\")   Wt 85.7 kg (189 lb)   SpO2 99%   BMI 26.37 kg/m²      BMI is >= 25 and <30. (Overweight) The following options were offered after discussion;: exercise counseling/recommendations and nutrition counseling/recommendations      Physical Exam    Vital signs reviewed.  General appearance: No acute distress  Eyes: conjunctiva clear without erythema; pupils equally round and reactive  ENT: external ears normal; hearing normal  Neck: supple; no thyromegaly  CV: normal rate and rhythm; no peripheral edema  Respiratory: normal respiratory effort; lungs clear to auscultation bilaterally  MSK: normal gait and station; no focal joint deformity or swelling  Skin: no rash or wounds; normal turgor  Neuro: cranial nerves 2-12 grossly intact; normal sensation to light touch  Psych: mood and affect normal; recent and remote memory intact      Current Outpatient Medications:     atorvastatin (LIPITOR) 20 MG tablet, TAKE 1 TABLET BY MOUTH DAILY, Disp: 90 tablet, Rfl: 0    esomeprazole (nexIUM) 40 MG capsule, Take 1 capsule by mouth Every Morning Before Breakfast., Disp: 90 capsule, Rfl: 0    hydroCHLOROthiazide 25 MG tablet, Take 1 tablet by mouth Daily., Disp: 30 tablet, Rfl: 2    Multiple Vitamins-Minerals (MULTIVITAMIN ADULT PO), Take 1 tablet by mouth Daily., Disp: , Rfl:     ramipril " (ALTACE) 10 MG capsule, Take 1 capsule by mouth Daily., Disp: 90 capsule, Rfl: 0    Testosterone Cypionate (DEPOTESTOTERONE CYPIONATE) 200 MG/ML injection, Inject 0.5 mL into the appropriate muscle as directed by prescriber 1 (One) Time Per Week., Disp: 6 mL, Rfl: 0    Diagnoses and all orders for this visit:    1. Annual physical exam (Primary)    2. Low testosterone in male  -     Testosterone Cypionate (DEPOTESTOTERONE CYPIONATE) 200 MG/ML injection; Inject 0.5 mL into the appropriate muscle as directed by prescriber 1 (One) Time Per Week.  Dispense: 6 mL; Refill: 0        Encourage healthy diet and exercise.  Encourage patient to stay up to date on screening examinations as indicated based on age and risk factors.  We reviewed his labs which are overall stable.  His HDL has come down a bit.  Also reviewed his testosterone which has been low on multiple readings.  Discussed options and agreed on starting with testosterone therapy.  Rationale reviewed.  Prescribing agreement completed.  Plan to recheck his level after 4 to 6 weeks of injection therapy.    Alon Rivera  reports that he has never smoked. He has never used smokeless tobacco.     Please note that portions of this note were completed with a voice recognition program.

## 2025-08-11 ENCOUNTER — PATIENT MESSAGE (OUTPATIENT)
Dept: SPORTS MEDICINE | Facility: CLINIC | Age: 55
End: 2025-08-11
Payer: COMMERCIAL

## 2025-08-11 DIAGNOSIS — R79.89 LOW TESTOSTERONE IN MALE: Primary | ICD-10-CM

## 2025-08-13 ENCOUNTER — LAB (OUTPATIENT)
Dept: LAB | Facility: HOSPITAL | Age: 55
End: 2025-08-13
Payer: COMMERCIAL

## 2025-08-13 DIAGNOSIS — R79.89 LOW TESTOSTERONE IN MALE: Primary | ICD-10-CM

## 2025-08-13 PROCEDURE — 84403 ASSAY OF TOTAL TESTOSTERONE: CPT

## 2025-08-13 PROCEDURE — 36415 COLL VENOUS BLD VENIPUNCTURE: CPT

## 2025-08-13 PROCEDURE — 84402 ASSAY OF FREE TESTOSTERONE: CPT

## 2025-08-15 DIAGNOSIS — K21.9 GASTROESOPHAGEAL REFLUX DISEASE: ICD-10-CM

## 2025-08-15 DIAGNOSIS — E78.2 MIXED HYPERLIPIDEMIA: ICD-10-CM

## 2025-08-15 RX ORDER — ATORVASTATIN CALCIUM 20 MG/1
20 TABLET, FILM COATED ORAL DAILY
Qty: 90 TABLET | Refills: 3 | Status: SHIPPED | OUTPATIENT
Start: 2025-08-15

## 2025-08-15 RX ORDER — ESOMEPRAZOLE MAGNESIUM 40 MG/1
40 CAPSULE, DELAYED RELEASE ORAL
Qty: 90 CAPSULE | Refills: 3 | Status: SHIPPED | OUTPATIENT
Start: 2025-08-15

## 2025-08-19 LAB
TESTOST FREE SERPL-MCNC: 12.5 PG/ML (ref 7.2–24)
TESTOST SERPL-MCNC: 636 NG/DL (ref 264–916)

## 2025-08-21 ENCOUNTER — OFFICE VISIT (OUTPATIENT)
Dept: SPORTS MEDICINE | Facility: CLINIC | Age: 55
End: 2025-08-21
Payer: COMMERCIAL

## 2025-08-21 VITALS
DIASTOLIC BLOOD PRESSURE: 82 MMHG | HEIGHT: 71 IN | BODY MASS INDEX: 26.46 KG/M2 | OXYGEN SATURATION: 99 % | HEART RATE: 93 BPM | RESPIRATION RATE: 16 BRPM | SYSTOLIC BLOOD PRESSURE: 124 MMHG | TEMPERATURE: 98.2 F | WEIGHT: 189 LBS

## 2025-08-21 DIAGNOSIS — R79.89 LOW TESTOSTERONE IN MALE: Primary | ICD-10-CM

## 2025-08-21 PROCEDURE — 99213 OFFICE O/P EST LOW 20 MIN: CPT | Performed by: FAMILY MEDICINE

## 2025-08-21 RX ORDER — TESTOSTERONE CYPIONATE 200 MG/ML
100 INJECTION, SOLUTION INTRAMUSCULAR WEEKLY
Qty: 6 ML | Refills: 1 | Status: SHIPPED | OUTPATIENT
Start: 2025-08-21

## (undated) DEVICE — CANN NASL CO2 TRULINK W/O2 A/

## (undated) DEVICE — TUBING, SUCTION, 1/4" X 10', STRAIGHT: Brand: MEDLINE

## (undated) DEVICE — THE TORRENT IRRIGATION SCOPE CONNECTOR IS USED WITH THE TORRENT IRRIGATION TUBING TO PROVIDE IRRIGATION FLUIDS SUCH AS STERILE WATER DURING GASTROINTESTINAL ENDOSCOPIC PROCEDURES WHEN USED IN CONJUNCTION WITH AN IRRIGATION PUMP (OR ELECTROSURGICAL UNIT).: Brand: TORRENT

## (undated) DEVICE — Device: Brand: DEFENDO AIR/WATER/SUCTION AND BIOPSY VALVE